# Patient Record
Sex: FEMALE | Race: WHITE
[De-identification: names, ages, dates, MRNs, and addresses within clinical notes are randomized per-mention and may not be internally consistent; named-entity substitution may affect disease eponyms.]

---

## 2017-05-14 ENCOUNTER — HOSPITAL ENCOUNTER (EMERGENCY)
Dept: HOSPITAL 35 - ER | Age: 42
Discharge: HOME | End: 2017-05-14
Payer: COMMERCIAL

## 2017-05-14 VITALS — BODY MASS INDEX: 51.91 KG/M2 | WEIGHT: 293 LBS | HEIGHT: 63 IN

## 2017-05-14 VITALS — DIASTOLIC BLOOD PRESSURE: 78 MMHG | SYSTOLIC BLOOD PRESSURE: 150 MMHG

## 2017-05-14 DIAGNOSIS — Z90.89: ICD-10-CM

## 2017-05-14 DIAGNOSIS — Z88.5: ICD-10-CM

## 2017-05-14 DIAGNOSIS — F32.9: ICD-10-CM

## 2017-05-14 DIAGNOSIS — Z88.1: ICD-10-CM

## 2017-05-14 DIAGNOSIS — J44.9: ICD-10-CM

## 2017-05-14 DIAGNOSIS — I10: ICD-10-CM

## 2017-05-14 DIAGNOSIS — L02.212: Primary | ICD-10-CM

## 2017-05-14 DIAGNOSIS — K21.9: ICD-10-CM

## 2017-05-14 DIAGNOSIS — F17.210: ICD-10-CM

## 2017-05-14 DIAGNOSIS — E11.9: ICD-10-CM

## 2017-05-14 DIAGNOSIS — Z90.49: ICD-10-CM

## 2017-05-14 DIAGNOSIS — Z88.0: ICD-10-CM

## 2017-05-14 DIAGNOSIS — F41.9: ICD-10-CM

## 2017-05-14 DIAGNOSIS — E66.9: ICD-10-CM

## 2018-06-09 ENCOUNTER — HOSPITAL ENCOUNTER (INPATIENT)
Dept: HOSPITAL 35 - ER | Age: 43
LOS: 3 days | Discharge: HOME | DRG: 189 | End: 2018-06-12
Attending: INTERNAL MEDICINE | Admitting: INTERNAL MEDICINE
Payer: COMMERCIAL

## 2018-06-09 VITALS — SYSTOLIC BLOOD PRESSURE: 140 MMHG | DIASTOLIC BLOOD PRESSURE: 63 MMHG

## 2018-06-09 VITALS — HEIGHT: 62.99 IN | BODY MASS INDEX: 51.91 KG/M2 | WEIGHT: 293 LBS

## 2018-06-09 DIAGNOSIS — J44.1: ICD-10-CM

## 2018-06-09 DIAGNOSIS — F32.9: ICD-10-CM

## 2018-06-09 DIAGNOSIS — J96.20: Primary | ICD-10-CM

## 2018-06-09 DIAGNOSIS — F41.9: ICD-10-CM

## 2018-06-09 DIAGNOSIS — F17.210: ICD-10-CM

## 2018-06-09 DIAGNOSIS — Z71.6: ICD-10-CM

## 2018-06-09 DIAGNOSIS — I10: ICD-10-CM

## 2018-06-09 DIAGNOSIS — Z88.0: ICD-10-CM

## 2018-06-09 DIAGNOSIS — Z79.4: ICD-10-CM

## 2018-06-09 DIAGNOSIS — Z88.5: ICD-10-CM

## 2018-06-09 DIAGNOSIS — E66.9: ICD-10-CM

## 2018-06-09 DIAGNOSIS — Z79.899: ICD-10-CM

## 2018-06-09 DIAGNOSIS — E11.65: ICD-10-CM

## 2018-06-09 DIAGNOSIS — Z88.1: ICD-10-CM

## 2018-06-09 DIAGNOSIS — K21.9: ICD-10-CM

## 2018-06-09 DIAGNOSIS — E78.5: ICD-10-CM

## 2018-06-09 LAB
ANION GAP SERPL CALC-SCNC: 12 MMOL/L (ref 7–16)
BUN SERPL-MCNC: 12 MG/DL (ref 7–18)
CALCIUM SERPL-MCNC: 8.8 MG/DL (ref 8.5–10.1)
CHLORIDE SERPL-SCNC: 97 MMOL/L (ref 98–107)
CO2 SERPL-SCNC: 23 MMOL/L (ref 21–32)
CREAT SERPL-MCNC: 1.2 MG/DL (ref 0.6–1)
ERYTHROCYTE [DISTWIDTH] IN BLOOD BY AUTOMATED COUNT: 14 % (ref 10.5–14.5)
GLUCOSE SERPL-MCNC: 611 MG/DL (ref 74–106)
HCT VFR BLD CALC: 43 % (ref 37–47)
HGB BLD-MCNC: 14.7 GM/DL (ref 12–15)
MCH RBC QN AUTO: 30.6 PG (ref 26–34)
MCHC RBC AUTO-ENTMCNC: 34.3 G/DL (ref 28–37)
MCV RBC: 89.3 FL (ref 80–100)
PLATELET # BLD: 248 THOU/UL (ref 150–400)
POTASSIUM SERPL-SCNC: 3.9 MMOL/L (ref 3.5–5.1)
RBC # BLD AUTO: 4.81 MIL/UL (ref 4.2–5)
SODIUM SERPL-SCNC: 132 MMOL/L (ref 136–145)
TROPONIN I SERPL-MCNC: < 0.04 NG/ML (ref ?–0.06)
WBC # BLD AUTO: 10.2 THOU/UL (ref 4–11)

## 2018-06-09 PROCEDURE — 10045: CPT

## 2018-06-10 VITALS — DIASTOLIC BLOOD PRESSURE: 73 MMHG | SYSTOLIC BLOOD PRESSURE: 90 MMHG

## 2018-06-10 VITALS — DIASTOLIC BLOOD PRESSURE: 52 MMHG | SYSTOLIC BLOOD PRESSURE: 108 MMHG

## 2018-06-10 VITALS — SYSTOLIC BLOOD PRESSURE: 125 MMHG | DIASTOLIC BLOOD PRESSURE: 78 MMHG

## 2018-06-10 VITALS — SYSTOLIC BLOOD PRESSURE: 140 MMHG | DIASTOLIC BLOOD PRESSURE: 63 MMHG

## 2018-06-10 VITALS — SYSTOLIC BLOOD PRESSURE: 137 MMHG | DIASTOLIC BLOOD PRESSURE: 76 MMHG

## 2018-06-10 VITALS — SYSTOLIC BLOOD PRESSURE: 147 MMHG | DIASTOLIC BLOOD PRESSURE: 70 MMHG

## 2018-06-11 VITALS — SYSTOLIC BLOOD PRESSURE: 125 MMHG | DIASTOLIC BLOOD PRESSURE: 57 MMHG

## 2018-06-11 VITALS — SYSTOLIC BLOOD PRESSURE: 118 MMHG | DIASTOLIC BLOOD PRESSURE: 53 MMHG

## 2018-06-11 VITALS — DIASTOLIC BLOOD PRESSURE: 74 MMHG | SYSTOLIC BLOOD PRESSURE: 117 MMHG

## 2018-06-11 VITALS — SYSTOLIC BLOOD PRESSURE: 146 MMHG | DIASTOLIC BLOOD PRESSURE: 74 MMHG

## 2018-06-11 LAB
ALBUMIN SERPL-MCNC: 2.4 G/DL (ref 3.4–5)
ALT SERPL-CCNC: 10 U/L (ref 30–65)
ANION GAP SERPL CALC-SCNC: 12 MMOL/L (ref 7–16)
AST SERPL-CCNC: 9 U/L (ref 15–37)
BILIRUB SERPL-MCNC: 0.2 MG/DL
BUN SERPL-MCNC: 17 MG/DL (ref 7–18)
CALCIUM SERPL-MCNC: 8.7 MG/DL (ref 8.5–10.1)
CHLORIDE SERPL-SCNC: 101 MMOL/L (ref 98–107)
CO2 SERPL-SCNC: 21 MMOL/L (ref 21–32)
CREAT SERPL-MCNC: 0.8 MG/DL (ref 0.6–1)
ERYTHROCYTE [DISTWIDTH] IN BLOOD BY AUTOMATED COUNT: 13.8 % (ref 10.5–14.5)
GLUCOSE SERPL-MCNC: 412 MG/DL (ref 74–106)
HCT VFR BLD CALC: 42.2 % (ref 37–47)
HGB BLD-MCNC: 14.3 GM/DL (ref 12–15)
MCH RBC QN AUTO: 30.3 PG (ref 26–34)
MCHC RBC AUTO-ENTMCNC: 34 G/DL (ref 28–37)
MCV RBC: 89.1 FL (ref 80–100)
PLATELET # BLD: 248 THOU/UL (ref 150–400)
POTASSIUM SERPL-SCNC: 4.4 MMOL/L (ref 3.5–5.1)
PROT SERPL-MCNC: 6.3 G/DL (ref 6.4–8.2)
RBC # BLD AUTO: 4.74 MIL/UL (ref 4.2–5)
SODIUM SERPL-SCNC: 134 MMOL/L (ref 136–145)
WBC # BLD AUTO: 11.4 THOU/UL (ref 4–11)

## 2018-06-12 VITALS — SYSTOLIC BLOOD PRESSURE: 117 MMHG | DIASTOLIC BLOOD PRESSURE: 69 MMHG

## 2018-06-12 VITALS — DIASTOLIC BLOOD PRESSURE: 68 MMHG | SYSTOLIC BLOOD PRESSURE: 129 MMHG

## 2018-06-12 VITALS — SYSTOLIC BLOOD PRESSURE: 129 MMHG | DIASTOLIC BLOOD PRESSURE: 68 MMHG

## 2018-06-12 LAB
ANION GAP SERPL CALC-SCNC: 10 MMOL/L (ref 7–16)
BASOPHILS NFR BLD AUTO: 0.1 % (ref 0–2)
BUN SERPL-MCNC: 25 MG/DL (ref 7–18)
CALCIUM SERPL-MCNC: 8.7 MG/DL (ref 8.5–10.1)
CHLORIDE SERPL-SCNC: 104 MMOL/L (ref 98–107)
CO2 SERPL-SCNC: 22 MMOL/L (ref 21–32)
CREAT SERPL-MCNC: 0.9 MG/DL (ref 0.6–1)
EOSINOPHIL NFR BLD: 0 % (ref 0–3)
ERYTHROCYTE [DISTWIDTH] IN BLOOD BY AUTOMATED COUNT: 14.3 % (ref 10.5–14.5)
EST. AVERAGE GLUCOSE BLD GHB EST-MCNC: 332 MG/DL
GLUCOSE SERPL-MCNC: 297 MG/DL (ref 74–106)
GLYCOHEMOGLOBIN (HGB A1C): 13.2 % (ref 4.8–5.6)
GRANULOCYTES NFR BLD MANUAL: 77.9 % (ref 36–66)
HCT VFR BLD CALC: 42.1 % (ref 37–47)
HGB BLD-MCNC: 14.1 GM/DL (ref 12–15)
LYMPHOCYTES NFR BLD AUTO: 17.4 % (ref 24–44)
MCH RBC QN AUTO: 30.1 PG (ref 26–34)
MCHC RBC AUTO-ENTMCNC: 33.4 G/DL (ref 28–37)
MCV RBC: 90.1 FL (ref 80–100)
MONOCYTES NFR BLD: 4.6 % (ref 1–8)
NEUTROPHILS # BLD: 9.2 THOU/UL (ref 1.4–8.2)
PLATELET # BLD: 262 THOU/UL (ref 150–400)
POTASSIUM SERPL-SCNC: 4 MMOL/L (ref 3.5–5.1)
RBC # BLD AUTO: 4.67 MIL/UL (ref 4.2–5)
SODIUM SERPL-SCNC: 136 MMOL/L (ref 136–145)
WBC # BLD AUTO: 11.8 THOU/UL (ref 4–11)

## 2018-07-11 ENCOUNTER — HOSPITAL ENCOUNTER (EMERGENCY)
Dept: HOSPITAL 35 - ER | Age: 43
Discharge: HOME | End: 2018-07-11
Payer: COMMERCIAL

## 2018-07-11 VITALS — HEIGHT: 63 IN | WEIGHT: 293 LBS | BODY MASS INDEX: 51.91 KG/M2

## 2018-07-11 VITALS — SYSTOLIC BLOOD PRESSURE: 132 MMHG | DIASTOLIC BLOOD PRESSURE: 75 MMHG

## 2018-07-11 DIAGNOSIS — K21.9: ICD-10-CM

## 2018-07-11 DIAGNOSIS — Z86.718: ICD-10-CM

## 2018-07-11 DIAGNOSIS — E66.01: ICD-10-CM

## 2018-07-11 DIAGNOSIS — Z90.49: ICD-10-CM

## 2018-07-11 DIAGNOSIS — I10: ICD-10-CM

## 2018-07-11 DIAGNOSIS — J18.9: Primary | ICD-10-CM

## 2018-07-11 DIAGNOSIS — F32.9: ICD-10-CM

## 2018-07-11 DIAGNOSIS — Z79.4: ICD-10-CM

## 2018-07-11 DIAGNOSIS — J44.9: ICD-10-CM

## 2018-07-11 DIAGNOSIS — E11.9: ICD-10-CM

## 2018-07-11 DIAGNOSIS — F41.9: ICD-10-CM

## 2018-07-11 DIAGNOSIS — Z88.1: ICD-10-CM

## 2018-07-11 DIAGNOSIS — F17.210: ICD-10-CM

## 2018-07-11 DIAGNOSIS — Z88.0: ICD-10-CM

## 2018-07-11 DIAGNOSIS — E78.5: ICD-10-CM

## 2018-07-11 DIAGNOSIS — Z88.8: ICD-10-CM

## 2018-07-11 LAB
ANION GAP SERPL CALC-SCNC: 3 MMOL/L (ref 7–16)
BASOPHILS NFR BLD AUTO: 1 % (ref 0–2)
BUN SERPL-MCNC: 18 MG/DL (ref 7–18)
CALCIUM SERPL-MCNC: 9.2 MG/DL (ref 8.5–10.1)
CHLORIDE SERPL-SCNC: 103 MMOL/L (ref 98–107)
CO2 SERPL-SCNC: 28 MMOL/L (ref 21–32)
CREAT SERPL-MCNC: 1.1 MG/DL (ref 0.6–1)
EOSINOPHIL NFR BLD: 2 % (ref 0–3)
ERYTHROCYTE [DISTWIDTH] IN BLOOD BY AUTOMATED COUNT: 14.2 % (ref 10.5–14.5)
GLUCOSE SERPL-MCNC: 180 MG/DL (ref 74–106)
GRANULOCYTES NFR BLD MANUAL: 72.8 % (ref 36–66)
HCT VFR BLD CALC: 40.7 % (ref 37–47)
HGB BLD-MCNC: 13.9 GM/DL (ref 12–15)
LYMPHOCYTES NFR BLD AUTO: 19.7 % (ref 24–44)
MCH RBC QN AUTO: 30.3 PG (ref 26–34)
MCHC RBC AUTO-ENTMCNC: 34.2 G/DL (ref 28–37)
MCV RBC: 88.7 FL (ref 80–100)
MONOCYTES NFR BLD: 4.5 % (ref 1–8)
NEUTROPHILS # BLD: 10.5 THOU/UL (ref 1.4–8.2)
PLATELET # BLD: 343 THOU/UL (ref 150–400)
POTASSIUM SERPL-SCNC: 4.2 MMOL/L (ref 3.5–5.1)
RBC # BLD AUTO: 4.58 MIL/UL (ref 4.2–5)
SODIUM SERPL-SCNC: 134 MMOL/L (ref 136–145)
TROPONIN I SERPL-MCNC: <0.06 NG/ML (ref ?–0.06)
WBC # BLD AUTO: 14.4 THOU/UL (ref 4–11)

## 2019-04-02 ENCOUNTER — HOSPITAL ENCOUNTER (EMERGENCY)
Dept: HOSPITAL 35 - ER | Age: 44
Discharge: HOME | End: 2019-04-02
Payer: COMMERCIAL

## 2019-04-02 VITALS — SYSTOLIC BLOOD PRESSURE: 139 MMHG | DIASTOLIC BLOOD PRESSURE: 86 MMHG

## 2019-04-02 VITALS — BODY MASS INDEX: 51.91 KG/M2 | HEIGHT: 63 IN | WEIGHT: 293 LBS

## 2019-04-02 DIAGNOSIS — F17.210: ICD-10-CM

## 2019-04-02 DIAGNOSIS — K21.9: ICD-10-CM

## 2019-04-02 DIAGNOSIS — Z88.1: ICD-10-CM

## 2019-04-02 DIAGNOSIS — R07.89: ICD-10-CM

## 2019-04-02 DIAGNOSIS — E78.5: ICD-10-CM

## 2019-04-02 DIAGNOSIS — E11.9: ICD-10-CM

## 2019-04-02 DIAGNOSIS — Z79.4: ICD-10-CM

## 2019-04-02 DIAGNOSIS — J18.9: Primary | ICD-10-CM

## 2019-04-02 DIAGNOSIS — Z88.0: ICD-10-CM

## 2019-04-02 DIAGNOSIS — I10: ICD-10-CM

## 2019-04-02 DIAGNOSIS — Z88.5: ICD-10-CM

## 2019-04-02 DIAGNOSIS — Z79.899: ICD-10-CM

## 2019-04-02 DIAGNOSIS — Z86.718: ICD-10-CM

## 2019-04-02 DIAGNOSIS — J44.9: ICD-10-CM

## 2019-04-02 LAB
ALBUMIN SERPL-MCNC: 4.3 G/DL (ref 3.4–5)
ALT SERPL-CCNC: 38 U/L (ref 30–65)
ANION GAP SERPL CALC-SCNC: 11 MMOL/L (ref 7–16)
AST SERPL-CCNC: 30 U/L (ref 15–37)
BASOPHILS NFR BLD AUTO: 0.4 % (ref 0–2)
BILIRUB SERPL-MCNC: 0.3 MG/DL
BUN SERPL-MCNC: 21 MG/DL (ref 7–18)
CALCIUM SERPL-MCNC: 11 MG/DL (ref 8.5–10.1)
CHLORIDE SERPL-SCNC: 102 MMOL/L (ref 98–107)
CO2 SERPL-SCNC: 27 MMOL/L (ref 21–32)
CREAT SERPL-MCNC: 1.1 MG/DL (ref 0.6–1)
EOSINOPHIL NFR BLD: 2.5 % (ref 0–3)
ERYTHROCYTE [DISTWIDTH] IN BLOOD BY AUTOMATED COUNT: 12.6 % (ref 10.5–14.5)
GLUCOSE SERPL-MCNC: 127 MG/DL (ref 74–106)
GRANULOCYTES NFR BLD MANUAL: 65.8 % (ref 36–66)
HCT VFR BLD CALC: 36.7 % (ref 37–47)
HGB BLD-MCNC: 12.7 GM/DL (ref 12–15)
LYMPHOCYTES NFR BLD AUTO: 24 % (ref 24–44)
MCH RBC QN AUTO: 33.2 PG (ref 26–34)
MCHC RBC AUTO-ENTMCNC: 34.5 G/DL (ref 28–37)
MCV RBC: 96.2 FL (ref 80–100)
MONOCYTES NFR BLD: 7.3 % (ref 1–8)
NEUTROPHILS # BLD: 4 THOU/UL (ref 1.4–8.2)
PLATELET # BLD: 192 THOU/UL (ref 150–400)
POTASSIUM SERPL-SCNC: 3.5 MMOL/L (ref 3.5–5.1)
PROT SERPL-MCNC: 7.6 G/DL (ref 6.4–8.2)
RBC # BLD AUTO: 3.82 MIL/UL (ref 4.2–5)
SODIUM SERPL-SCNC: 140 MMOL/L (ref 136–145)
TROPONIN I SERPL-MCNC: <0.06 NG/ML (ref ?–0.06)
WBC # BLD AUTO: 6 THOU/UL (ref 4–11)

## 2019-04-10 ENCOUNTER — HOSPITAL ENCOUNTER (EMERGENCY)
Dept: HOSPITAL 35 - ER | Age: 44
LOS: 1 days | Discharge: HOME | End: 2019-04-11
Payer: COMMERCIAL

## 2019-04-10 VITALS — HEIGHT: 63 IN | BODY MASS INDEX: 51.74 KG/M2 | WEIGHT: 292 LBS

## 2019-04-10 DIAGNOSIS — Z88.1: ICD-10-CM

## 2019-04-10 DIAGNOSIS — Z90.49: ICD-10-CM

## 2019-04-10 DIAGNOSIS — I10: ICD-10-CM

## 2019-04-10 DIAGNOSIS — Z79.4: ICD-10-CM

## 2019-04-10 DIAGNOSIS — E11.9: ICD-10-CM

## 2019-04-10 DIAGNOSIS — J44.9: ICD-10-CM
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
## 2019-04-10 DIAGNOSIS — Z88.0: ICD-10-CM

## 2019-04-10 DIAGNOSIS — K21.9: ICD-10-CM

## 2019-04-10 DIAGNOSIS — F17.210: ICD-10-CM

## 2019-04-10 DIAGNOSIS — Z86.718: ICD-10-CM

## 2019-04-10 DIAGNOSIS — E78.5: ICD-10-CM

## 2019-04-10 DIAGNOSIS — S09.8XXA: Primary | ICD-10-CM
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
## 2019-04-10 DIAGNOSIS — F32.9: ICD-10-CM

## 2019-04-10 DIAGNOSIS — F41.9: ICD-10-CM

## 2019-04-10 DIAGNOSIS — E66.9: ICD-10-CM

## 2019-04-10 DIAGNOSIS — Z88.5: ICD-10-CM
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
## 2019-04-11 VITALS — SYSTOLIC BLOOD PRESSURE: 126 MMHG | DIASTOLIC BLOOD PRESSURE: 72 MMHG

## 2019-04-22 ENCOUNTER — HOSPITAL ENCOUNTER (EMERGENCY)
Dept: HOSPITAL 35 - ER | Age: 44
Discharge: HOME | End: 2019-04-22
Payer: COMMERCIAL

## 2019-04-22 VITALS — DIASTOLIC BLOOD PRESSURE: 66 MMHG | SYSTOLIC BLOOD PRESSURE: 107 MMHG

## 2019-04-22 VITALS — BODY MASS INDEX: 51.91 KG/M2 | HEIGHT: 63 IN | WEIGHT: 293 LBS

## 2019-04-22 DIAGNOSIS — Z90.49: ICD-10-CM

## 2019-04-22 DIAGNOSIS — Z88.0: ICD-10-CM

## 2019-04-22 DIAGNOSIS — Z88.8: ICD-10-CM

## 2019-04-22 DIAGNOSIS — Z86.718: ICD-10-CM

## 2019-04-22 DIAGNOSIS — I10: ICD-10-CM

## 2019-04-22 DIAGNOSIS — F41.9: ICD-10-CM

## 2019-04-22 DIAGNOSIS — F17.210: ICD-10-CM

## 2019-04-22 DIAGNOSIS — Z79.4: ICD-10-CM

## 2019-04-22 DIAGNOSIS — J18.8: Primary | ICD-10-CM

## 2019-04-22 DIAGNOSIS — F32.9: ICD-10-CM

## 2019-04-22 DIAGNOSIS — K21.9: ICD-10-CM

## 2019-04-22 DIAGNOSIS — J44.9: ICD-10-CM

## 2019-04-22 DIAGNOSIS — E66.9: ICD-10-CM

## 2019-04-22 DIAGNOSIS — E11.9: ICD-10-CM

## 2019-04-22 DIAGNOSIS — Z88.1: ICD-10-CM

## 2019-04-22 DIAGNOSIS — E78.5: ICD-10-CM

## 2019-04-22 LAB
ANION GAP SERPL CALC-SCNC: 11 MMOL/L (ref 7–16)
BASOPHILS NFR BLD AUTO: 1.1 % (ref 0–2)
BUN SERPL-MCNC: 11 MG/DL (ref 7–18)
CALCIUM SERPL-MCNC: 8.8 MG/DL (ref 8.5–10.1)
CHLORIDE SERPL-SCNC: 101 MMOL/L (ref 98–107)
CO2 SERPL-SCNC: 23 MMOL/L (ref 21–32)
CREAT SERPL-MCNC: 1.5 MG/DL (ref 0.6–1)
EOSINOPHIL NFR BLD: 0.6 % (ref 0–3)
ERYTHROCYTE [DISTWIDTH] IN BLOOD BY AUTOMATED COUNT: 13.9 % (ref 10.5–14.5)
GLUCOSE SERPL-MCNC: 403 MG/DL (ref 74–106)
GRANULOCYTES NFR BLD MANUAL: 80.1 % (ref 36–66)
HCT VFR BLD CALC: 38.6 % (ref 37–47)
HGB BLD-MCNC: 13.1 GM/DL (ref 12–15)
LYMPHOCYTES NFR BLD AUTO: 14.3 % (ref 24–44)
MCH RBC QN AUTO: 30 PG (ref 26–34)
MCHC RBC AUTO-ENTMCNC: 33.8 G/DL (ref 28–37)
MCV RBC: 88.5 FL (ref 80–100)
MONOCYTES NFR BLD: 3.9 % (ref 1–8)
NEUTROPHILS # BLD: 12.5 THOU/UL (ref 1.4–8.2)
PLATELET # BLD: 326 THOU/UL (ref 150–400)
POTASSIUM SERPL-SCNC: 4.4 MMOL/L (ref 3.5–5.1)
RBC # BLD AUTO: 4.36 MIL/UL (ref 4.2–5)
SODIUM SERPL-SCNC: 135 MMOL/L (ref 136–145)
TROPONIN I SERPL-MCNC: <0.06 NG/ML (ref ?–0.06)
WBC # BLD AUTO: 15.6 THOU/UL (ref 4–11)

## 2019-08-31 ENCOUNTER — HOSPITAL ENCOUNTER (EMERGENCY)
Dept: HOSPITAL 35 - ER | Age: 44
LOS: 1 days | End: 2019-09-01
Payer: COMMERCIAL

## 2019-08-31 VITALS — HEIGHT: 63 IN | BODY MASS INDEX: 51.91 KG/M2 | WEIGHT: 293 LBS
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
## 2019-08-31 DIAGNOSIS — E11.9: ICD-10-CM

## 2019-08-31 DIAGNOSIS — S70.01XA: Primary | ICD-10-CM
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
## 2019-08-31 DIAGNOSIS — J44.9: ICD-10-CM

## 2019-08-31 DIAGNOSIS — Z86.718: ICD-10-CM

## 2019-08-31 DIAGNOSIS — S39.012A: ICD-10-CM

## 2019-08-31 DIAGNOSIS — I10: ICD-10-CM

## 2019-08-31 DIAGNOSIS — Z79.4: ICD-10-CM

## 2019-08-31 DIAGNOSIS — K21.9: ICD-10-CM

## 2019-08-31 DIAGNOSIS — E78.5: ICD-10-CM

## 2019-08-31 DIAGNOSIS — Z88.6: ICD-10-CM
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
## 2019-08-31 DIAGNOSIS — F32.9: ICD-10-CM

## 2019-08-31 DIAGNOSIS — Z88.1: ICD-10-CM

## 2019-08-31 DIAGNOSIS — F41.9: ICD-10-CM

## 2019-08-31 DIAGNOSIS — F17.210: ICD-10-CM
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
## 2019-08-31 DIAGNOSIS — Z90.49: ICD-10-CM

## 2019-08-31 DIAGNOSIS — Z88.0: ICD-10-CM

## 2019-09-01 VITALS — DIASTOLIC BLOOD PRESSURE: 60 MMHG | SYSTOLIC BLOOD PRESSURE: 123 MMHG

## 2020-02-10 ENCOUNTER — HOSPITAL ENCOUNTER (EMERGENCY)
Dept: HOSPITAL 35 - ER | Age: 45
Discharge: HOME | End: 2020-02-10
Payer: COMMERCIAL

## 2020-02-10 VITALS — HEIGHT: 63 IN | BODY MASS INDEX: 51.91 KG/M2 | WEIGHT: 293 LBS

## 2020-02-10 VITALS — DIASTOLIC BLOOD PRESSURE: 87 MMHG | SYSTOLIC BLOOD PRESSURE: 141 MMHG

## 2020-02-10 DIAGNOSIS — K21.9: ICD-10-CM

## 2020-02-10 DIAGNOSIS — R07.89: Primary | ICD-10-CM

## 2020-02-10 DIAGNOSIS — Z86.711: ICD-10-CM

## 2020-02-10 DIAGNOSIS — Z90.89: ICD-10-CM

## 2020-02-10 DIAGNOSIS — F41.9: ICD-10-CM

## 2020-02-10 DIAGNOSIS — Z88.5: ICD-10-CM

## 2020-02-10 DIAGNOSIS — Z88.1: ICD-10-CM

## 2020-02-10 DIAGNOSIS — Z90.49: ICD-10-CM

## 2020-02-10 DIAGNOSIS — E78.5: ICD-10-CM

## 2020-02-10 DIAGNOSIS — F32.9: ICD-10-CM

## 2020-02-10 DIAGNOSIS — F17.210: ICD-10-CM

## 2020-02-10 DIAGNOSIS — Z79.4: ICD-10-CM

## 2020-02-10 DIAGNOSIS — I10: ICD-10-CM

## 2020-02-10 DIAGNOSIS — J44.9: ICD-10-CM

## 2020-02-10 DIAGNOSIS — Z88.0: ICD-10-CM

## 2020-02-10 LAB
ANION GAP SERPL CALC-SCNC: 9 MMOL/L (ref 7–16)
BASOPHILS NFR BLD AUTO: 0.7 % (ref 0–2)
BUN SERPL-MCNC: 18 MG/DL (ref 7–18)
CALCIUM SERPL-MCNC: 9 MG/DL (ref 8.5–10.1)
CHLORIDE SERPL-SCNC: 98 MMOL/L (ref 98–107)
CO2 SERPL-SCNC: 26 MMOL/L (ref 21–32)
CREAT SERPL-MCNC: 1.2 MG/DL (ref 0.6–1)
EOSINOPHIL NFR BLD: 1.4 % (ref 0–3)
ERYTHROCYTE [DISTWIDTH] IN BLOOD BY AUTOMATED COUNT: 14.5 % (ref 10.5–14.5)
GLUCOSE SERPL-MCNC: 501 MG/DL (ref 74–106)
GRANULOCYTES NFR BLD MANUAL: 76.7 % (ref 36–66)
HCT VFR BLD CALC: 39.2 % (ref 37–47)
HGB BLD-MCNC: 13.1 GM/DL (ref 12–15)
LYMPHOCYTES NFR BLD AUTO: 17 % (ref 24–44)
MCH RBC QN AUTO: 28.4 PG (ref 26–34)
MCHC RBC AUTO-ENTMCNC: 33.4 G/DL (ref 28–37)
MCV RBC: 84.9 FL (ref 80–100)
MONOCYTES NFR BLD: 4.2 % (ref 1–8)
NEUTROPHILS # BLD: 7.1 THOU/UL (ref 1.4–8.2)
PLATELET # BLD: 282 THOU/UL (ref 150–400)
POTASSIUM SERPL-SCNC: 4.4 MMOL/L (ref 3.5–5.1)
RBC # BLD AUTO: 4.62 MIL/UL (ref 4.2–5)
SODIUM SERPL-SCNC: 133 MMOL/L (ref 136–145)
TROPONIN I SERPL-MCNC: <0.06 NG/ML (ref ?–0.06)
WBC # BLD AUTO: 9.3 THOU/UL (ref 4–11)

## 2020-02-12 NOTE — EKG
[facility]
[place]   [address]                     ELECTROCARDIOGRAM REPORT      
_______________________________________________________________________________
 
Name:       EDSON BELL                Room #:                     [facility]#:      [id number]                       Account #:      [id number]  
Admission:  02/10/20    Attend Phys:                          
Discharge:  02/10/20    Date of Birth:  [birth year]  
                                                          Report #: [id number]
                                                                    [id number]
_______________________________________________________________________________
THIS REPORT FOR:  
 
cc:  SAVANNA MEDINA DO           
     Physician not on staff        
     Néstor Pryor MD [facility]                                        ~
THIS REPORT FOR:   //name//                          
 
                         [facility] ED
                                       
Test Date:    2020-02-10               Test Time:    17:35:30
Pat Name:     EDSON BELL             Department:   
Patient ID:   [id number]            Room:          
Gender:       F                        Technician:   
:          1975               Requested By: Shantel Mcclellan
Order Number: [id number] MD:   Néstor Pryor
                                 Measurements
Intervals                              Axis          
Rate:         106                      P:            45
CA:           117                      QRS:          16
QRSD:         129                      T:            0
QT:           359                                    
QTc:          477                                    
                           Interpretive Statements
Sinus tachycardia
Inferior infarct, old
Compared to ECG 2019 20:38:57
No significant change was found
Electronically Signed On 2020 9:10:00 CST by Néstor Pryor
[email or web address]
 
 
 
 
 
 
 
 
 
 
 
 
 
 
 
  <ELECTRONICALLY SIGNED>
   By: Néstor Pryor MD, [facility]   
  20     0910
D: 02/10/20 1735                           _____________________________________
T: 02/10/20 173                           Néstor Pryor MD, [facility]     /EPI

## 2020-04-21 ENCOUNTER — HOSPITAL ENCOUNTER (INPATIENT)
Dept: HOSPITAL 35 - ER | Age: 45
LOS: 5 days | Discharge: HOME | DRG: 603 | End: 2020-04-26
Attending: HOSPITALIST | Admitting: HOSPITALIST
Payer: COMMERCIAL

## 2020-04-21 VITALS — SYSTOLIC BLOOD PRESSURE: 166 MMHG | DIASTOLIC BLOOD PRESSURE: 74 MMHG

## 2020-04-21 VITALS — SYSTOLIC BLOOD PRESSURE: 146 MMHG | DIASTOLIC BLOOD PRESSURE: 84 MMHG

## 2020-04-21 VITALS — BODY MASS INDEX: 51.91 KG/M2 | WEIGHT: 293 LBS | HEIGHT: 62.99 IN

## 2020-04-21 VITALS — DIASTOLIC BLOOD PRESSURE: 87 MMHG | SYSTOLIC BLOOD PRESSURE: 141 MMHG

## 2020-04-21 VITALS — SYSTOLIC BLOOD PRESSURE: 128 MMHG | DIASTOLIC BLOOD PRESSURE: 68 MMHG

## 2020-04-21 DIAGNOSIS — E78.5: ICD-10-CM

## 2020-04-21 DIAGNOSIS — Z86.718: ICD-10-CM

## 2020-04-21 DIAGNOSIS — L03.311: Primary | ICD-10-CM

## 2020-04-21 DIAGNOSIS — I12.9: ICD-10-CM

## 2020-04-21 DIAGNOSIS — F41.9: ICD-10-CM

## 2020-04-21 DIAGNOSIS — N18.3: ICD-10-CM

## 2020-04-21 DIAGNOSIS — E11.42: ICD-10-CM

## 2020-04-21 DIAGNOSIS — Z90.49: ICD-10-CM

## 2020-04-21 DIAGNOSIS — Z88.5: ICD-10-CM

## 2020-04-21 DIAGNOSIS — E66.01: ICD-10-CM

## 2020-04-21 DIAGNOSIS — Z98.890: ICD-10-CM

## 2020-04-21 DIAGNOSIS — M19.90: ICD-10-CM

## 2020-04-21 DIAGNOSIS — J44.9: ICD-10-CM

## 2020-04-21 DIAGNOSIS — Z87.891: ICD-10-CM

## 2020-04-21 DIAGNOSIS — E11.22: ICD-10-CM

## 2020-04-21 DIAGNOSIS — Z79.01: ICD-10-CM

## 2020-04-21 DIAGNOSIS — Z79.899: ICD-10-CM

## 2020-04-21 DIAGNOSIS — K21.9: ICD-10-CM

## 2020-04-21 DIAGNOSIS — Z88.8: ICD-10-CM

## 2020-04-21 DIAGNOSIS — Z88.6: ICD-10-CM

## 2020-04-21 DIAGNOSIS — Z88.0: ICD-10-CM

## 2020-04-21 DIAGNOSIS — Z79.4: ICD-10-CM

## 2020-04-21 DIAGNOSIS — F32.9: ICD-10-CM

## 2020-04-21 LAB
ALBUMIN SERPL-MCNC: 2.7 G/DL (ref 3.4–5)
ALT SERPL-CCNC: 20 U/L (ref 30–65)
ANION GAP SERPL CALC-SCNC: 13 MMOL/L (ref 7–16)
AST SERPL-CCNC: 14 U/L (ref 15–37)
BASOPHILS NFR BLD AUTO: 0.9 % (ref 0–2)
BILIRUB SERPL-MCNC: 0.3 MG/DL
BUN SERPL-MCNC: 17 MG/DL (ref 7–18)
CALCIUM SERPL-MCNC: 8.4 MG/DL (ref 8.5–10.1)
CHLORIDE SERPL-SCNC: 103 MMOL/L (ref 98–107)
CO2 SERPL-SCNC: 21 MMOL/L (ref 21–32)
CREAT SERPL-MCNC: 1.3 MG/DL (ref 0.6–1)
EOSINOPHIL NFR BLD: 1 % (ref 0–3)
ERYTHROCYTE [DISTWIDTH] IN BLOOD BY AUTOMATED COUNT: 16.2 % (ref 10.5–14.5)
GLUCOSE SERPL-MCNC: 434 MG/DL (ref 74–106)
GRANULOCYTES NFR BLD MANUAL: 76.7 % (ref 36–66)
HCT VFR BLD CALC: 40 % (ref 37–47)
HGB BLD-MCNC: 13.3 GM/DL (ref 12–15)
LYMPHOCYTES NFR BLD AUTO: 17.1 % (ref 24–44)
MCH RBC QN AUTO: 28.5 PG (ref 26–34)
MCHC RBC AUTO-ENTMCNC: 33.2 G/DL (ref 28–37)
MCV RBC: 86.1 FL (ref 80–100)
MONOCYTES NFR BLD: 4.3 % (ref 1–8)
NEUTROPHILS # BLD: 7.1 THOU/UL (ref 1.4–8.2)
PLATELET # BLD: 321 THOU/UL (ref 150–400)
POTASSIUM SERPL-SCNC: 5 MMOL/L (ref 3.5–5.1)
PROT SERPL-MCNC: 6 G/DL (ref 6.4–8.2)
RBC # BLD AUTO: 4.65 MIL/UL (ref 4.2–5)
SODIUM SERPL-SCNC: 137 MMOL/L (ref 136–145)
WBC # BLD AUTO: 9.3 THOU/UL (ref 4–11)

## 2020-04-21 PROCEDURE — 10195: CPT

## 2020-04-22 VITALS — SYSTOLIC BLOOD PRESSURE: 137 MMHG | DIASTOLIC BLOOD PRESSURE: 73 MMHG

## 2020-04-22 VITALS — SYSTOLIC BLOOD PRESSURE: 153 MMHG | DIASTOLIC BLOOD PRESSURE: 91 MMHG

## 2020-04-22 VITALS — SYSTOLIC BLOOD PRESSURE: 155 MMHG | DIASTOLIC BLOOD PRESSURE: 77 MMHG

## 2020-04-22 VITALS — SYSTOLIC BLOOD PRESSURE: 149 MMHG | DIASTOLIC BLOOD PRESSURE: 61 MMHG

## 2020-04-22 VITALS — SYSTOLIC BLOOD PRESSURE: 156 MMHG | DIASTOLIC BLOOD PRESSURE: 93 MMHG

## 2020-04-22 LAB
ANION GAP SERPL CALC-SCNC: 9 MMOL/L (ref 7–16)
BILIRUB UR-MCNC: NEGATIVE MG/DL
BUN SERPL-MCNC: 21 MG/DL (ref 7–18)
CALCIUM SERPL-MCNC: 7.9 MG/DL (ref 8.5–10.1)
CHLORIDE SERPL-SCNC: 101 MMOL/L (ref 98–107)
CO2 SERPL-SCNC: 25 MMOL/L (ref 21–32)
COLOR UR: YELLOW
CREAT SERPL-MCNC: 1.3 MG/DL (ref 0.6–1)
ERYTHROCYTE [DISTWIDTH] IN BLOOD BY AUTOMATED COUNT: 16.4 % (ref 10.5–14.5)
GLUCOSE SERPL-MCNC: 508 MG/DL (ref 74–106)
HCT VFR BLD CALC: 38.8 % (ref 37–47)
HGB BLD-MCNC: 12.6 GM/DL (ref 12–15)
HYALINE CASTS #/AREA URNS LPF: (no result) /LPF
INR PPP: 1.5
KETONES UR STRIP-MCNC: NEGATIVE MG/DL
MCH RBC QN AUTO: 28.5 PG (ref 26–34)
MCHC RBC AUTO-ENTMCNC: 32.6 G/DL (ref 28–37)
MCV RBC: 87.4 FL (ref 80–100)
PLATELET # BLD: 311 THOU/UL (ref 150–400)
POTASSIUM SERPL-SCNC: 4.7 MMOL/L (ref 3.5–5.1)
PROTHROMBIN TIME: 14.7 SECONDS (ref 9.3–11.4)
RBC # BLD AUTO: 4.44 MIL/UL (ref 4.2–5)
RBC # UR STRIP: (no result) /UL
RBC #/AREA URNS HPF: (no result) /HPF (ref 0–2)
SODIUM SERPL-SCNC: 135 MMOL/L (ref 136–145)
SP GR UR STRIP: 1.02 (ref 1–1.03)
SQUAMOUS: (no result) /LPF (ref 0–3)
URINE CLARITY: CLEAR
URINE GLUCOSE-RANDOM*: (no result)
URINE LEUKOCYTES-REFLEX: NEGATIVE
URINE NITRITE-REFLEX: NEGATIVE
URINE PROTEIN (DIPSTICK): (no result)
URINE WBC-REFLEX: (no result) /HPF (ref 0–5)
UROBILINOGEN UR STRIP-ACNC: 0.2 E.U./DL (ref 0.2–1)
WBC # BLD AUTO: 8.4 THOU/UL (ref 4–11)

## 2020-04-22 NOTE — HC
[facility]
[address]
[place]   [address]                     CONSULTATION                  
_______________________________________________________________________________
 
Name:       EDSON BELL                Room #:         [facility] [place]  
M.R.#:      [id number]                       Account #:      [id number]  
Admission:  04/21/20    Attend Phys:    Ishan Payne MD     
Discharge:              Date of Birth:  [birth year]  
                                                          Report #: [id number]
                                                                    [id number]   
_______________________________________________________________________________
THIS REPORT FOR:  
 
cc:  SAVANNA MEDINA DO           
     Physician not on staff                                               
     Verito Peña MD                                         ~
CC: SAVANNA Payne
    Physician staff
 
DATE OF SERVICE:  04/22/2020
 
 
ENDOCRINE CONSULTATION NOTE
 
CONSULTING PHYSICIAN:  Dr. Rodarte.
 
REASON FOR CONSULTATION:  Uncontrolled type 2 diabetes mellitus.
 
HISTORY OF PRESENT ILLNESS:  This is a 45-year-old female patient whose medical
background is significant for multiple significant medical issues including type
2 diabetes mellitus, diabetic neuropathy, COPD, GERD, hypertension and
hyperlipidemia in the setting of morbid obesity who presented yesterday with
complaints of progressive lower extremity edema, fluid retention and dyspnea on
exertion.  This seems to have progressed slowly over the past few weeks.
 
The patient notes that her history of type 2 diabetes mellitus dates back to
over 10 years ago and that she is supposed to be on a regimen of Levemir insulin
50 units twice a day and Humalog insulin 35 units t.i.d. a.c.  She does,
however, indicate that she has been unable to access her medications due to loss
of insurance and that there have been significant gaps in therapy, although she
did not specify and what matter and for how long.  She did, however, indicate
that her blood glucose values have predominantly been over 400 mg/dL for the
past few weeks without issues of hypoglycemia.
 
The patient's background is negative for diabetic retinopathy, but is noted for
peripheral diabetic neuropathy and although she takes gabapentin 300 mg b.i.d.,
she has not been able to control this aspect well.  Also, she notes a history of
stage 3 chronic kidney disease and that she was for some time under the care of
a nephrologist, but that she has not done so in quite a while due to loss of
health coverage.  The patient is not known to have CAD, CHF or strokes.
 
The patient is known to have hyperlipidemia and is maintained on simvastatin
therapy and she is also on antihypertensive therapy.
 
REVIEW OF SYSTEMS:
CONSTITUTIONAL:  Fatigue, tiredness, but no fever, chills or changes in body
 
 
 
[address]   [address]                     CONSULTATION                  
_______________________________________________________________________________
 
Name:       EDSON BELL                Room #:         [address] [place] [address] [place]  
..#:      [id number]                       Account #:      [id number]  
Admission:  04/21/20    Attend Phys:    Ishan Payne MD     
Discharge:              Date of Birth:  [birth year]  
                                                          Report #: [id number]
                                                                    [id number]   
_______________________________________________________________________________
weight.
HEENT:  Negative for sore throat, sinus pain, ear drainage.
PULMONARY:  Noted for dyspnea on exertion, intermittent cough, but no
hemoptysis.
CARDIAC:  Lower extremity edema, dyspnea on exertion or orthopnea.  No chest
pain or palpitations.
GASTROINTESTINAL:  Abdominal discomfort, abdominal distention and occasional
nausea, but not vomiting.
NEUROLOGY:  Baseline is noted for diabetic neuropathy that is painful, not well
controlled by the current gabapentin therapy, no seizure activities, frequent
severe headaches or loss of consciousness.
PSYCHIATRIC:  The patient has baseline anxiety and depression issues.  Seemingly
stable.  No active delusions or hallucinations.  Otherwise, review of systems is
noncontributory other than those mentioned in HPI.
 
PAST MEDICAL HISTORY:
1.  Type 2 diabetes mellitus.
2.  Peripheral diabetic neuropathy.
3.  Chronic kidney disease stage 3.
4.  Hypertension.
5.  Hyperlipidemia.
6.  Morbid obesity.
7.  Depression.
8.  Anxiety.
9.  Chronic obstructive pulmonary disease.
10.  Gastroesophageal reflux disease.
11.  History of deep venous thrombosis of the lower extremity.
12.  Osteoarthritis.
 
OUTPATIENT MEDICATIONS:  Include albuterol p.r.n., Coumadin 5 mg daily, vitamin
D3 25 mcg daily, loratadine 10 mg daily, diltiazem 120 mg daily, Cymbalta 30 mg
t.i.d., Neurontin 300 mg b.i.d., Zantac 150 mg b.i.d., Requip 1 mg at bedtime,
Desyrel 50 mg at bedtime, Elavil 100 mg at bedtime, Singulair 10 mg daily, Zocor
20 mg at bedtime, multivitamins, Aldactone 25 mg b.i.d., Xanax 0.25 mg t.i.d.,
Levemir insulin 50 units b.i.d., NovoLog 35 units t.i.d. a.c., omeprazole
b.i.d., bupropion  mg daily.
 
ALLERGIES:  SHE IS ALLERGIC TO DILAUDID, LEVOFLOXACIN, PENICILLIN.
 
FAMILY HISTORY:  Noncontributory.
 
SOCIAL HISTORY:  She is , has no children.  Denies active use of alcohol
or tobacco.
 
PHYSICAL EXAMINATION:
GENERAL:  Pleasant  female patient who is not in apparent pain or
 
 
 
[facility]
[address] [facility] [place]   [address]                     CONSULTATION                  
_______________________________________________________________________________
 
Name:       EDSON BELL                Room #:         438-P       [facility] [place]  
MARCOS#:      [id number]                       Account #:      [id number]  
Admission:  04/21/20    Attend Phys:    Ishan Payne MD     
Discharge:              Date of Birth:  [birth year]  
                                                          Report #: [id number]
                                                                    [id number]   
_______________________________________________________________________________
distress.
VITAL SIGNS:  Blood pressure is 137/73 mmHg, heart rate is 97 beats per minute,
respirations 18 per minute, temperature 36.6 degrees Celsius.
CONSTITUTIONAL:  The patient is sitting upright in bed, appears relatively
comfortable, not in apparent distress.
HEENT:  Anicteric sclerae.  Intact extraocular motions.
NECK:  Supple, without JVD.  No thyromegaly.
CHEST:  Noted for distant breath sounds, scattered rales, rhonchi.
HEART:  Regular rate and rhythm without murmurs or gallops.
ABDOMEN:  Obese, tense, but without guarding or significant tenderness.  Active
bowel sounds.
EXTREMITIES:  Lower extremity exam is noted for stasis dermatitis bilaterally,
+1 ankle edema bilaterally.  No skin breaks or ulcerations.  Sensation to light
touch is diminished.  Pedal pulses are diminished.
NEUROLOGICALLY:  Awake, alert and oriented to time, place and person.  The
remainder of her examination is nonfocal, other than for sensory deficits.
PSYCHIATRIC:  Pleasant, interactive.  Normal mood and affect.
 
LABORATORY DATA:  Blood glucose values have consistently been over 300 mg/dL and
ranged from 330 to 371 mg/dL.  Otherwise, sodium 135, potassium 4.7, chloride
101, CO2 of 25, anion gap 9, BUN 21, creatinine 1.3, AST 14, total bilirubin
0.3, calcium 7.9, magnesium 1.9, alkaline phosphatase 167, ALT 20, total protein
6.0, albumin 2.7, EGFR 44.  Lactic acid 1.6.  Troponin negative.  . 
White blood count 8.4, hemoglobin 12.6, hematocrit 38.8, platelets 311. 
Hemoglobin A1c in 06/2018 was 13.2%.
 
ASSESSMENT AND PLAN:
1.  Type 2 diabetes mellitus.
 
Uncontrolled as per her reported blood glucose values and as per the outlook of
historic hyperglycemia and multiple end-organ complications.  It is in question
as to whether or not the patient has actually been getting the reported insulin
regimen as she indicated the inability to access her insulin consistently due to
insurance barriers.  She is currently maintained on Lantus insulin 50 units
b.i.d. and Humalog insulin 35 units with meals.  Given the patient's persistent
hyperglycemia, I will raise her Lantus insulin to 60 units twice a day and
Humalog insulin to 44 units t.i.d. a.c. and maintain support with Humalog
supplemental scale.  Blood glucose monitoring will commence a.c. and at bedtime
and further therapeutic adjustments will be made accordingly.
 
I will obtain a hemoglobin A1c to better assess the patient's most recent
glycemic standing.  Towards home discharge, the patient might be more suited for
human insulin, potentially mixed insulin due to the cost advantage and to ensure
insulin intake consistency.
 
2.  Hyperlipidemia.
 
 
 
[place] [id number]                     CONSULTATION                  
_______________________________________________________________________________
 
Name:       EDSON BELL                Room #:         438-P       [facility] [place]  
[facility]#:      [id number]                       Account #:      [id number]  
Admission:  04/21/20    Attend Phys:    Ishan Payne MD     
Discharge:              Date of Birth:  [birth year]  
                                                          Report #: [id number]
                                                                    [id number]   
_______________________________________________________________________________
 
The patient is maintained on lipid-lowering therapy and is currently placed on
atorvastatin 10 mg daily, which she tolerates well, she is to continue with the
same.
 
3.  Peripheral diabetic neuropathy.
 
This is a baseline issue that is only partially controlled.  She is to continue
with the current Cymbalta and gabapentin therapy for the time being.
 
4.  Hypertension.
 
The patient's level of blood pressure control is adequate, she is to continue
with the current regimen.
 
5.  Renal insufficiency.
 
The patient has baseline stage 3 chronic kidney disease and her current renal
function indices are consistent with that.  I stressed the importance of
sustained blood sugar and blood pressure control in order to benefit this aspect
of her care, which she understood well.
 
I appreciate this consultation by Dr. Rodarte.
 
 
 
 
 
 
 
 
 
 
 
 
 
 
 
 
 
 
 
 
 
  <ELECTRONICALLY SIGNED>
   By: Verito Peña MD         
  04/22/20     1533
D: 04/22/20 1254                           _____________________________________
T: 04/22/20 1357                           Verito Peña MD           /nt

## 2020-04-22 NOTE — NUR
PT A&OX4. IV INTACT IN L FA. AMBULATES TO BSC WITH ASSIST X1. PI IS NON
COMPLIANT WITH HER DIABETIC DIET. FOUND GRAM CRACKER WRAPPERS ON THE FLOOR
THIS AM AT SHIFT CHANGE, PT IS ASKING FOR FOOD THRUGHOUT THE DAY, THIS NURSE
DID NOT GIVE HER ANY EXTRA FOOD OR SNACKS.

## 2020-04-22 NOTE — NUR
PT ADMITTED RELATED TO CELLULITIS. CM REVIEWED CHART AND SPOKE WITH CARE TEAM.
CM CALLED AND SPOKE WITH PT THIS DAY. SHE APPEARED TO BE A&O X4. CM ROLE
INTRODUCED. PT INDICATED SHE LIVES IN A HOUSE WITH HER SPOUSE, BROTHER, AND
SISTER IN [place]. PT INDICATED SHE HAD USED A FWW TO ASSIST WITH MOBILITY PTA AND
THAT SHE HAD BEEN INDEPENDENT WITH ADLS. PT INDICATED SHE HAD HOME 02 AT 2L
PTA THROUGH [employer] [facility]. PT HAS A NEBULIZER AS WELL. PT INDICATED
THAT SHE PLANS TO RETURN HOME ONCE MEDICALLY STABLE. CM TO FOLLOW AS INDICATED
WITH DC PLANNING.

## 2020-04-22 NOTE — NUR
PATIENT ARRIVED ON UNIT AT 2000 VIA CART FROM ED ACCOMPANIED BY ED ALESSANDRO.
PATIENT ALERT AND ORIENTED X4.  ABD OBESE WITH AN INFECTION UNDER PANIS.
ORDER RECEIVED TO PLACE INFRADRY UNDER PANIS TO KEEP IT DRY.  UNDER PANIS IS
RED WITH A SPLIT OF ABOUT AN INCH AND A HALF.  IT IS VERY TENDER TO PATIENT.
ACCUCHECK  WITH LAB.  NP WAS CALLED AND ORDERS TAKEN, AND FOLLOWED.
PUT AN SS LOW DOSE INSULIN AND GIVEN 12 UNITS.  IN ABOUT 2 HOURS IT WAS
337.PATIENT C/O PAIN.  PO MED GIVEN X2 WITH LITTLE RELIEF.  IV MED GIVEN X1
WITH EDUCATION THAT IT WAS TO GET HER OVER THE HUMP AND TO NOT DEPEND ON IT IF
SHE DID NOT NEED IT.  PATIENT SLEP[T LITTLE THIS NIGHT SHIFT.

## 2020-04-23 VITALS — DIASTOLIC BLOOD PRESSURE: 83 MMHG | SYSTOLIC BLOOD PRESSURE: 150 MMHG

## 2020-04-23 VITALS — DIASTOLIC BLOOD PRESSURE: 78 MMHG | SYSTOLIC BLOOD PRESSURE: 144 MMHG

## 2020-04-23 VITALS — SYSTOLIC BLOOD PRESSURE: 156 MMHG | DIASTOLIC BLOOD PRESSURE: 76 MMHG

## 2020-04-23 LAB
ANION GAP SERPL CALC-SCNC: 10 MMOL/L (ref 7–16)
BUN SERPL-MCNC: 22 MG/DL (ref 7–18)
CALCIUM SERPL-MCNC: 8 MG/DL (ref 8.5–10.1)
CHLORIDE SERPL-SCNC: 102 MMOL/L (ref 98–107)
CO2 SERPL-SCNC: 23 MMOL/L (ref 21–32)
CREAT SERPL-MCNC: 1.1 MG/DL (ref 0.6–1)
ERYTHROCYTE [DISTWIDTH] IN BLOOD BY AUTOMATED COUNT: 16.9 % (ref 10.5–14.5)
EST. AVERAGE GLUCOSE BLD GHB EST-MCNC: 258 MG/DL
GLUCOSE SERPL-MCNC: 235 MG/DL (ref 74–106)
GLYCOHEMOGLOBIN (HGB A1C): 10.6 % (ref 4.8–5.6)
HCT VFR BLD CALC: 38.6 % (ref 37–47)
HGB BLD-MCNC: 12.8 GM/DL (ref 12–15)
MCH RBC QN AUTO: 28.8 PG (ref 26–34)
MCHC RBC AUTO-ENTMCNC: 33.1 G/DL (ref 28–37)
MCV RBC: 86.9 FL (ref 80–100)
PLATELET # BLD: 327 THOU/UL (ref 150–400)
POTASSIUM SERPL-SCNC: 4.2 MMOL/L (ref 3.5–5.1)
RBC # BLD AUTO: 4.44 MIL/UL (ref 4.2–5)
SODIUM SERPL-SCNC: 135 MMOL/L (ref 136–145)
WBC # BLD AUTO: 9.5 THOU/UL (ref 4–11)

## 2020-04-23 NOTE — NUR
Assumed pt care at 1900, A/OX4, VSS. Up with min assist to BSC,RW/GB w/o
problems. C/o pain to BLE/under pannus, medicated per EMAR with relief
reported. Interdry fabric applied under pannus and in place,edema noted on BLE
encouraged to elevate extremities. Resting quietly at this time w/o distress
noted, call light/personal items within reach. Will continue to monitor pt.

## 2020-04-23 NOTE — NUR
PT A&OX4. AMBULATES WITH STANDBY ASSIST. IV FLUIDS INFUSING W/O COMPS IN L AC.
PT IS MORE COMPLIANT OF DIET TODAY ASKING LESS FOR FOOD TO EAT. I SPENT A
LENTHGY TIME YESTERDAY EDUCATING PT ON THE DANGERS OF NON COMPLIANCE WITH DIET
AND HIGH BLOOD SUGARS. PT VERBALIZES UNDERSTANDING. CALL LIGHT W/I REACH. WILL
CONT POC.

## 2020-04-23 NOTE — NUR
ON-GOING ASSESSMENT: PT REMAINS ON IV FLUCONAZOLE AND IS SLOWLY PROGRESSING
TOWARDS GOALS. CM WILL CONTINUE TO FOLLOW TO ASSIST AS NEEDED.

## 2020-04-24 VITALS — DIASTOLIC BLOOD PRESSURE: 89 MMHG | SYSTOLIC BLOOD PRESSURE: 166 MMHG

## 2020-04-24 VITALS — DIASTOLIC BLOOD PRESSURE: 64 MMHG | SYSTOLIC BLOOD PRESSURE: 139 MMHG

## 2020-04-24 VITALS — SYSTOLIC BLOOD PRESSURE: 158 MMHG | DIASTOLIC BLOOD PRESSURE: 65 MMHG

## 2020-04-24 LAB
ANION GAP SERPL CALC-SCNC: 9 MMOL/L (ref 7–16)
BUN SERPL-MCNC: 20 MG/DL (ref 7–18)
CALCIUM SERPL-MCNC: 8.2 MG/DL (ref 8.5–10.1)
CHLORIDE SERPL-SCNC: 106 MMOL/L (ref 98–107)
CO2 SERPL-SCNC: 24 MMOL/L (ref 21–32)
CREAT SERPL-MCNC: 1.1 MG/DL (ref 0.6–1)
ERYTHROCYTE [DISTWIDTH] IN BLOOD BY AUTOMATED COUNT: 16.3 % (ref 10.5–14.5)
GLUCOSE SERPL-MCNC: 138 MG/DL (ref 74–106)
HCT VFR BLD CALC: 36.6 % (ref 37–47)
HGB BLD-MCNC: 12.1 GM/DL (ref 12–15)
INR PPP: 2.7
MCH RBC QN AUTO: 28.6 PG (ref 26–34)
MCHC RBC AUTO-ENTMCNC: 33 G/DL (ref 28–37)
MCV RBC: 86.8 FL (ref 80–100)
PLATELET # BLD: 307 THOU/UL (ref 150–400)
POTASSIUM SERPL-SCNC: 4.4 MMOL/L (ref 3.5–5.1)
PROTHROMBIN TIME: 27.9 SECONDS (ref 9.3–11.4)
RBC # BLD AUTO: 4.21 MIL/UL (ref 4.2–5)
SODIUM SERPL-SCNC: 139 MMOL/L (ref 136–145)
WBC # BLD AUTO: 7.9 THOU/UL (ref 4–11)

## 2020-04-24 NOTE — NUR
PT IS SLOWLY PROGRESSING. IT IS ANTIPATED THAT PT WILL BE MEDICALLY STABLE TO
DC HOME OVER THE WEEKEND. PT HAD HOME O2 AND A FWW. IT IS ANTICIPATED THAT PT
WILL BE ABLE TO DC HOME WITH NO NEEDS.

## 2020-04-24 NOTE — NUR
RECIEVED CARE OF THIS PATIENT AT 1900.  PATIENT ALERT AND ORIENTED X4.  UP
WITH SBA.  HAD SHOWER THIS SHIFT.  IV IN LAC WITH FLUIDS INFUSING.  ZHENG LOWER
EXT EDEMA.  ACCUCHECK WAS 86.  SNACK GIVEN.  AFTER SHOWER IT WAS 79, SNACK
GIVEN.  C/O PAIN, MED GIVEN.  SLEPT OFF AND ON DURING NIGHT.

## 2020-04-24 NOTE — NUR
ASSUMED CARE AT 0700. PT ALERT AND ORIENTED WITH NO VOICED CONCERNS AT THE
TIME. VSS, RA. PAIN BEING CONTROLLED WITH PRN MEDS. PIV INFUSING WITHOUT
COMPLICATIONS. PT STATES SHE IS "WEAK" AND "IN PAIN". WILL CONTINUE TO
MONITOR. TOLERATING PO AND SUGARS BEING MONITORED AND TREATED PRN.

## 2020-04-24 NOTE — NUR
Assess due to high BMI 71.3, extreme class III obesity.  Hx poorly controlled
diabetes, A1C 10.6, HTN, CKD.  Endocronology following, pt requires scheduled
insulin and ss insulin.  Noted had lost insurance coverage and not receiving
all scheduled insulin at home.  Appetite is good, sometimes asking for more
food throughout day.  Wts typically average >400 lb.  Pt denied need for diet
education.  Did review allowed carbs for hospital menu selections.  Asking for
diet soda/coffee and will approve on heart healthy options for pt.  Admitted
for panus cellulitis.  Low nutrition risk.

## 2020-04-25 VITALS — DIASTOLIC BLOOD PRESSURE: 83 MMHG | SYSTOLIC BLOOD PRESSURE: 152 MMHG

## 2020-04-25 VITALS — DIASTOLIC BLOOD PRESSURE: 72 MMHG | SYSTOLIC BLOOD PRESSURE: 156 MMHG

## 2020-04-25 VITALS — DIASTOLIC BLOOD PRESSURE: 90 MMHG | SYSTOLIC BLOOD PRESSURE: 145 MMHG

## 2020-04-25 LAB
INR PPP: 3.1
PROTHROMBIN TIME: 32.1 SECONDS (ref 9.3–11.4)

## 2020-04-25 NOTE — NUR
ASSESSED AT START OF SHIFT PT A&OX4 IV INTACT AND ABX INFUISING. BLOOD SUGAR
CHECKED AND LONG ACTING INSULIN ADMINISTERED. NIGHT TIME SNACKS ALSO PROVIDED.
HYDROCODONE GIVEN FOR PAIN MED OF 8/10 IN BLE. FALL PREC IN PLACE AND CALL
LIGHT IN REACH WILL CONT WITH POC TILL EOS.

## 2020-04-25 NOTE — NUR
Assumed care of pt at 0700. Pt assumed care of pt at 0700. Pain controlled
with prn pain meds. Pt's bloog sugar 68 this afternoon. Treated, rechecked and
was 98. IVF D/C'd. Call light within reach. Report given to rosita SCOTT.

## 2020-04-25 NOTE — NUR
ASSESSMENT COMPLETED. PT CONTINUES ON IV ABTS AND IVF. C/O PAIN TO BLE,
RELIEVED BY NORCO. PT ASKS FOR SNACKS AND SODA MOST OF THE TIME, DIABETIC
EDUCATION PROVIDED.SATTING OK ON ROOM AIR. UP WITH SBA.TIGHT EDEMA TO BLE.WILL
CONTINUE WITH POC TILL EOS.

## 2020-04-26 VITALS — SYSTOLIC BLOOD PRESSURE: 156 MMHG | DIASTOLIC BLOOD PRESSURE: 83 MMHG

## 2020-04-26 VITALS — SYSTOLIC BLOOD PRESSURE: 143 MMHG | DIASTOLIC BLOOD PRESSURE: 69 MMHG

## 2020-04-26 LAB
ANION GAP SERPL CALC-SCNC: 6 MMOL/L (ref 7–16)
BUN SERPL-MCNC: 19 MG/DL (ref 7–18)
CALCIUM SERPL-MCNC: 8.7 MG/DL (ref 8.5–10.1)
CHLORIDE SERPL-SCNC: 104 MMOL/L (ref 98–107)
CO2 SERPL-SCNC: 28 MMOL/L (ref 21–32)
CREAT SERPL-MCNC: 1.3 MG/DL (ref 0.6–1)
ERYTHROCYTE [DISTWIDTH] IN BLOOD BY AUTOMATED COUNT: 16.5 % (ref 10.5–14.5)
GLUCOSE SERPL-MCNC: 161 MG/DL (ref 74–106)
HCT VFR BLD CALC: 37.4 % (ref 37–47)
HGB BLD-MCNC: 12.1 GM/DL (ref 12–15)
INR PPP: 3
MCH RBC QN AUTO: 28.2 PG (ref 26–34)
MCHC RBC AUTO-ENTMCNC: 32.4 G/DL (ref 28–37)
MCV RBC: 87.1 FL (ref 80–100)
PLATELET # BLD: 352 THOU/UL (ref 150–400)
POTASSIUM SERPL-SCNC: 5 MMOL/L (ref 3.5–5.1)
PROTHROMBIN TIME: 30.9 SECONDS (ref 9.3–11.4)
RBC # BLD AUTO: 4.3 MIL/UL (ref 4.2–5)
SODIUM SERPL-SCNC: 138 MMOL/L (ref 136–145)
WBC # BLD AUTO: 9 THOU/UL (ref 4–11)

## 2020-04-26 NOTE — NUR
Assumed pt care at 7am.Pt in chair resting without c/o.Assessment completed.
vss.Meds given as scheduled and well tolerated.Dr Obregon here,dc order noted.
Pt called this rn into her room and stated that she wasn't comfortable dc home
today.Dr Obregon notified.He said that pt has completed her doses of antibiotic
and staying longer in hospital at this period wasn't encouraged due to covid
19.Pt informed and agreed to go home today.Dc summary compile and reviewed
with pt.Rx and dc summary copy given. Pt took shower and saline loclk dc'd.
At 1430,pt dc home in [place] with spouse in stable condition.

## 2020-05-30 ENCOUNTER — HOSPITAL ENCOUNTER (INPATIENT)
Dept: HOSPITAL 35 - ER | Age: 45
LOS: 4 days | Discharge: HOME HEALTH | DRG: 872 | End: 2020-06-03
Attending: INTERNAL MEDICINE | Admitting: INTERNAL MEDICINE
Payer: COMMERCIAL

## 2020-05-30 VITALS — BODY MASS INDEX: 51.91 KG/M2 | WEIGHT: 293 LBS | HEIGHT: 62.99 IN

## 2020-05-30 VITALS — SYSTOLIC BLOOD PRESSURE: 180 MMHG | DIASTOLIC BLOOD PRESSURE: 85 MMHG

## 2020-05-30 DIAGNOSIS — Z91.14: ICD-10-CM

## 2020-05-30 DIAGNOSIS — A41.9: Primary | ICD-10-CM

## 2020-05-30 DIAGNOSIS — F41.9: ICD-10-CM

## 2020-05-30 DIAGNOSIS — E66.01: ICD-10-CM

## 2020-05-30 DIAGNOSIS — E78.5: ICD-10-CM

## 2020-05-30 DIAGNOSIS — L03.115: ICD-10-CM

## 2020-05-30 DIAGNOSIS — J44.9: ICD-10-CM

## 2020-05-30 DIAGNOSIS — K21.9: ICD-10-CM

## 2020-05-30 DIAGNOSIS — M19.90: ICD-10-CM

## 2020-05-30 DIAGNOSIS — F32.9: ICD-10-CM

## 2020-05-30 DIAGNOSIS — Z88.6: ICD-10-CM

## 2020-05-30 DIAGNOSIS — Z90.49: ICD-10-CM

## 2020-05-30 DIAGNOSIS — R65.20: ICD-10-CM

## 2020-05-30 DIAGNOSIS — E11.42: ICD-10-CM

## 2020-05-30 DIAGNOSIS — Z88.1: ICD-10-CM

## 2020-05-30 DIAGNOSIS — E11.9: ICD-10-CM

## 2020-05-30 DIAGNOSIS — Z86.718: ICD-10-CM

## 2020-05-30 DIAGNOSIS — I77.6: ICD-10-CM

## 2020-05-30 DIAGNOSIS — I10: ICD-10-CM

## 2020-05-30 DIAGNOSIS — N18.3: ICD-10-CM

## 2020-05-30 DIAGNOSIS — Z88.0: ICD-10-CM

## 2020-05-30 LAB
ANION GAP SERPL CALC-SCNC: 9 MMOL/L (ref 7–16)
APTT BLD: 31.7 SECONDS (ref 24.5–32.8)
BASOPHILS NFR BLD AUTO: 1.1 % (ref 0–2)
BUN SERPL-MCNC: 11 MG/DL (ref 7–18)
CALCIUM SERPL-MCNC: 8.5 MG/DL (ref 8.5–10.1)
CHLORIDE SERPL-SCNC: 103 MMOL/L (ref 98–107)
CO2 SERPL-SCNC: 25 MMOL/L (ref 21–32)
CREAT SERPL-MCNC: 1.3 MG/DL (ref 0.6–1)
EOSINOPHIL NFR BLD: 0.9 % (ref 0–3)
ERYTHROCYTE [DISTWIDTH] IN BLOOD BY AUTOMATED COUNT: 15.5 % (ref 10.5–14.5)
GLUCOSE SERPL-MCNC: 231 MG/DL (ref 74–106)
GRANULOCYTES NFR BLD MANUAL: 75.2 % (ref 36–66)
HCT VFR BLD CALC: 42.9 % (ref 37–47)
HGB BLD-MCNC: 14.5 GM/DL (ref 12–15)
INR PPP: 1.1
LYMPHOCYTES NFR BLD AUTO: 18.3 % (ref 24–44)
MCH RBC QN AUTO: 28.1 PG (ref 26–34)
MCHC RBC AUTO-ENTMCNC: 33.7 G/DL (ref 28–37)
MCV RBC: 83.3 FL (ref 80–100)
MONOCYTES NFR BLD: 4.5 % (ref 1–8)
NEUTROPHILS # BLD: 8.5 THOU/UL (ref 1.4–8.2)
PLATELET # BLD: 353 THOU/UL (ref 150–400)
POTASSIUM SERPL-SCNC: 3.6 MMOL/L (ref 3.5–5.1)
PROTHROMBIN TIME: 11.1 SECONDS (ref 9.3–11.4)
RBC # BLD AUTO: 5.15 MIL/UL (ref 4.2–5)
SODIUM SERPL-SCNC: 137 MMOL/L (ref 136–145)
WBC # BLD AUTO: 11.2 THOU/UL (ref 4–11)

## 2020-05-30 PROCEDURE — 10040 EXTRACTION: CPT

## 2020-05-30 PROCEDURE — 10195: CPT

## 2020-05-30 NOTE — HC
[facility] [place], [place]
Garett Oliv[place]   [address]                     CONSULTATION                  
_______________________________________________________________________________
 
Name:       KRISTY PEOPLES                Room #:         454-[facility] [place]  
M.R.#:      [id number]                       Account #:      [id number]  
Admission:  05/31/20    Attend Phys:    Johan Harvey MD    
Discharge:              Date of Birth:  [birth year]  
                                                          Report #: [id number]
                                                                    [id number]   
_______________________________________________________________________________
THIS REPORT FOR:  
 
cc:  [facility] physician unknown
     [facility] physician unknown                                       
     Enrico Peters MD                                             ~
CC: Johan Harvey
    [facility] unknown
 
DATE OF SERVICE:  05/31/2020
 
 
CONSULTATION:  Infectious diseases.
 
HISTORY OF PRESENT ILLNESS:  Kristy Peoples is a 45-year-old  female
who comes to the hospital because of a painful skin rash.  The patient states
she was in her usual state of health except for some higher than usual sugars on
fingerstick.  On May 28, she had fairly sudden onset of a painful erythematous
macular rash on her medial right knee.  This became worse and spread over the
29th.  She came to the ER on the 30th and was admitted with cellulitis versus
vasculitis.  Infectious Disease consultation was requested.
 
PAST MEDICAL HISTORY:  Significant for diabetes, hypertension and
hyperlipidemia.  The patient has COPD.  She at times had an elevated creatinine,
 past history of deep vein thrombosis and pulmonary embolus.  The patient is
significantly obese, weighing approximately 350 pounds.
 
PAST SURGICAL HISTORY:  Includes appendectomy, cholecystectomy and
tonsillectomy.  
 
ALLERGIES:  SHE HAS A HISTORY OF ALLERGY TO PENICILLIN, LEVAQUIN, AND DILAUDID.
 
FAMILY HISTORY:  Noncontributory.
 
SOCIAL HISTORY:  The patient is .  She lives with her  and
brother-in-law and his wife.  The patient was a pack to a pack and half a day
smoker, but quit about a year ago.  The ____ has continued to smoke.  The
patient denies alcohol or drug use.  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
disability because of declining health related to her obesity and COPD.
 
REVIEW OF SYSTEMS:  
The patient is not complaining of fevers, chills, sweats.
ENT:  No complaints.
CARDIOVASCULAR:  The patient denies cough, chest pain, shortness of breath.  No
angina, syncope nor palpitations.
GASTROINTESTINAL:  The patient denies nausea, vomiting, diarrhea, constipation. 
She is having some abdominal discomfort.
 
 
 
[facility] [place]
[address] [place]   [address]                     CONSULTATION                  
_______________________________________________________________________________
 
Name:       KRISTY PEOPLES                Room #:         454-P       [facility].#:      [id number]                       Account #:      [id number]  
Admission:  05/31/20    Attend Phys:    Johan Harvey MD    
Discharge:              Date of Birth:  [birth year]  
                                                          Report #: [id number]
                                                                    [id number]   
_______________________________________________________________________________
EXTREMITIES:  The patient complains of pain around her rash.
 
PHYSICAL EXAMINATION:
GENERAL:  The patient appears alert, oriented, comfortable, not in any distress.
VITAL SIGNS:  Normal.  The patient is afebrile.
SKIN:  Shows the rash on the right medial knee.  There are a series of 2 mm
almost petechial type dots, but they become confluent.  The rash measures
probably in total 11 x 11 cm in a shape somewhat like the [place]
with the lower left side having more normal skin.  The borders are somewhat
indistinct as the rash tapers off into satellite lesions.  There may be a little
bit of early rash formation around the right ankle and also some going down the
right posterior calf.  There is no adenopathy.  The patient was admitted about a
month ago for a monilial rash in the pannus.  This has mostly resolved, and has
just a little bit of residual erythema.
ENT:  Negative.
HEART:  Sounds are normal.
LUNGS:  Clear.
ABDOMEN:  Belly is obese, soft, not tender.
EXTREMITIES:  In addition to the rash is noted to have mild venous stasis
changes with 1+ edema and some early rubor.  Pulses are excellent.  Capillary
refill is good.
 
This looks most like a typical cellulitic type rash, probably staph or strep.  I
would anticipate a good response to cefazolin 3 grams IV daily.  The satellite
lesions suggest possible yeast, but the rash does not really look yeasty and the
patient is not complaining of itching.  This looks very different than the
monilial rash in the pannus.
 
For now, I would suggest treating for the stasis with elevation and compression.
 We will use IV antibiotics.  We can use Sarna lotion for topical relief as the
skin is intact without any open wounds nor drainage.  If the rash does not
improve with this regimen, we could consider skin biopsy or broader antibiotics.
 
 
 
 
 
 
 
 
 
 
 
 
                         
   By:                               
                   
D: 05/31/20 1834                           _____________________________________
T: 05/31/20 2007                           Enrico Peters MD               /nt

## 2020-05-31 VITALS — SYSTOLIC BLOOD PRESSURE: 135 MMHG | DIASTOLIC BLOOD PRESSURE: 72 MMHG

## 2020-05-31 VITALS — DIASTOLIC BLOOD PRESSURE: 87 MMHG | SYSTOLIC BLOOD PRESSURE: 175 MMHG

## 2020-05-31 VITALS — SYSTOLIC BLOOD PRESSURE: 145 MMHG | DIASTOLIC BLOOD PRESSURE: 62 MMHG

## 2020-05-31 VITALS — DIASTOLIC BLOOD PRESSURE: 89 MMHG | SYSTOLIC BLOOD PRESSURE: 140 MMHG

## 2020-05-31 VITALS — DIASTOLIC BLOOD PRESSURE: 69 MMHG | SYSTOLIC BLOOD PRESSURE: 134 MMHG

## 2020-05-31 VITALS — SYSTOLIC BLOOD PRESSURE: 140 MMHG | DIASTOLIC BLOOD PRESSURE: 89 MMHG

## 2020-05-31 LAB
BACTERIA #/AREA URNS HPF: (no result) /HPF
BILIRUB UR-MCNC: NEGATIVE MG/DL
COLOR UR: YELLOW
INR PPP: 1.1
KETONES UR STRIP-MCNC: NEGATIVE MG/DL
MUCUS: (no result) STRN/LPF
NITRITE UR QL STRIP: NEGATIVE
PROTHROMBIN TIME: 10.8 SECONDS (ref 9.3–11.4)
RBC # UR STRIP: (no result) /UL
RBC #/AREA URNS HPF: (no result) /HPF (ref 0–2)
SP GR UR STRIP: 1.01 (ref 1–1.03)
SQUAMOUS: (no result) /LPF (ref 0–3)
URINE CLARITY: CLEAR
URINE GLUCOSE-RANDOM*: (no result)
URINE LEUKOCYTES: NEGATIVE
URINE PROTEIN (DIPSTICK): (no result)
UROBILINOGEN UR STRIP-ACNC: 0.2 E.U./DL (ref 0.2–1)
WBC #/AREA URNS HPF: (no result) /HPF (ref 0–5)

## 2020-05-31 NOTE — NUR
Assumed patient care at 0715. She is up with stand-by assist. LSCTA, BS x's 4,
ABD is soft and non-tender. She continues with a bright red rash to right
lower leg. Patient has asked and received Morphine 0.5mL per IV push q
3-4 hours for "level six to seven" pain at rash site. Blood sugars were 413 at
0730 and 490 at 1230. She was given 12 Units of Sliding Scale Lispro with 54
Units of Glargine at Breakfast. Dr Hooper notified; no new orders. Dr Hooper was
notified again of 490 Blood Sugar at 1230 per verbal order (and per protocol),
patient was given 20 Units of Lispro at Lunchtime. This nurse asked patient if
she has any extra snacks in her room that could be increasing her Blood
Sugars. Patient denies having any snacks in her room; she allowed this nurse
to look in her cabinets/drawers and nothing was found. Will report to
on-coming nurse and continue to monitor this patient.

## 2020-05-31 NOTE — NUR
Pt. admitted to the unit from the emergency room accompanied by staff.  She
is alert and oriented.  Pt. has a red rash to her right medial knee and upper
calf area.  She does c/o pain to the area and po tylenol given (see emar) with
some relief.  Admission assessment and history is completed.  Up to the
bathroom with standby.

## 2020-06-01 VITALS — DIASTOLIC BLOOD PRESSURE: 74 MMHG | SYSTOLIC BLOOD PRESSURE: 126 MMHG

## 2020-06-01 VITALS — SYSTOLIC BLOOD PRESSURE: 145 MMHG | DIASTOLIC BLOOD PRESSURE: 84 MMHG

## 2020-06-01 VITALS — DIASTOLIC BLOOD PRESSURE: 71 MMHG | SYSTOLIC BLOOD PRESSURE: 141 MMHG

## 2020-06-01 VITALS — SYSTOLIC BLOOD PRESSURE: 126 MMHG | DIASTOLIC BLOOD PRESSURE: 74 MMHG

## 2020-06-01 LAB
ANION GAP SERPL CALC-SCNC: 8 MMOL/L (ref 7–16)
BASOPHILS NFR BLD AUTO: 0.4 % (ref 0–2)
BUN SERPL-MCNC: 24 MG/DL (ref 7–18)
CALCIUM SERPL-MCNC: 8.1 MG/DL (ref 8.5–10.1)
CHLORIDE SERPL-SCNC: 102 MMOL/L (ref 98–107)
CO2 SERPL-SCNC: 23 MMOL/L (ref 21–32)
CREAT SERPL-MCNC: 1 MG/DL (ref 0.6–1)
EOSINOPHIL NFR BLD: 0 % (ref 0–3)
ERYTHROCYTE [DISTWIDTH] IN BLOOD BY AUTOMATED COUNT: 15.5 % (ref 10.5–14.5)
GLUCOSE SERPL-MCNC: 312 MG/DL (ref 74–106)
GRANULOCYTES NFR BLD MANUAL: 81.5 % (ref 36–66)
HCT VFR BLD CALC: 39.1 % (ref 37–47)
HGB BLD-MCNC: 13 GM/DL (ref 12–15)
INR PPP: 1.1
LYMPHOCYTES NFR BLD AUTO: 13.1 % (ref 24–44)
MCH RBC QN AUTO: 28 PG (ref 26–34)
MCHC RBC AUTO-ENTMCNC: 33.2 G/DL (ref 28–37)
MCV RBC: 84.4 FL (ref 80–100)
MONOCYTES NFR BLD: 5 % (ref 1–8)
NEUTROPHILS # BLD: 9.1 THOU/UL (ref 1.4–8.2)
PLATELET # BLD: 349 THOU/UL (ref 150–400)
POTASSIUM SERPL-SCNC: 4.1 MMOL/L (ref 3.5–5.1)
PROTHROMBIN TIME: 10.9 SECONDS (ref 9.3–11.4)
RBC # BLD AUTO: 4.63 MIL/UL (ref 4.2–5)
SODIUM SERPL-SCNC: 133 MMOL/L (ref 136–145)
WBC # BLD AUTO: 11.2 THOU/UL (ref 4–11)

## 2020-06-01 NOTE — NUR
ORDERS RECEIVED FOR EVAL AND TREAT.  PER NURSING NOTES, THE Pt IS UP AD KEILA.
SPOKE WITH Pt WHO STATES SHE IS HAVING NO DIFFICULTY WITH HER MOBILITY AND HAS
BEEN UP ON HER OWN.  Pt DECLINING FORMAL P.T. EVAL

## 2020-06-01 NOTE — NUR
Assumed patient care at 0715. Vital signs stable. Blood sugars have improved
due to new scheduled Lispro, as well as Lispro Sliding Scale. Highest Blood
sugar was 351 at 0758. Blood Sugar 187 at 1657. Patient now has Hydrocodone
1-2 tabs as needed per pain scale. She recieved 1 tab this am, 2 tabs at 1750
for right leg pain of "seven." This medication has been effective for pain
management. Will report to on-coming nurse and continue to monitor.

## 2020-06-01 NOTE — NUR
chart review, cm consult. cm visited with michael via phone call, she a & o x 3,
and able to make her needs now. intro to cm, dcp and transition of care. "
live at home with spouse, brother and sister in law. feel safe and have
support at home if needed. has walker with seat- function properly and not
broken. 2 steps enter home with hand rail. manage own medication. have cpap
from [facility] and home o2 from am home patient. never driven before. use 5 gallon
bucket for bathing.  no rehab or hh in past. dr vasile santos if my primary
 i am very tired and weak so rehab might be ok"/michael. will cont following
as needed for dc needs.

## 2020-06-01 NOTE — NUR
PAIN CONTROLLED THIS SHIFT. PATIENT BLOOD SUGAR  AT AROUND 2100,CALLED
NP PER DELPHINE NEW ORDER TO GIVE 10 UNITS OF HUMALOG AND CHECK BLOOD SUGAR AT
NIGHT. BLOOD SUGAR  AT THIS TIME. PATIENT REEDUCATED ON CARB CONTROLLED
DIET. PATIENT IS UP AT KEILA. PATIENT IN BED ASLEEP AT THIS TIME BREATHING
REGULAR AND UNLABOURED.

## 2020-06-02 VITALS — DIASTOLIC BLOOD PRESSURE: 84 MMHG | SYSTOLIC BLOOD PRESSURE: 147 MMHG

## 2020-06-02 VITALS — SYSTOLIC BLOOD PRESSURE: 143 MMHG | DIASTOLIC BLOOD PRESSURE: 78 MMHG

## 2020-06-02 VITALS — DIASTOLIC BLOOD PRESSURE: 76 MMHG | SYSTOLIC BLOOD PRESSURE: 143 MMHG

## 2020-06-02 VITALS — SYSTOLIC BLOOD PRESSURE: 117 MMHG | DIASTOLIC BLOOD PRESSURE: 65 MMHG

## 2020-06-02 LAB
ANION GAP SERPL CALC-SCNC: 6 MMOL/L (ref 7–16)
BUN SERPL-MCNC: 26 MG/DL (ref 7–18)
CALCIUM SERPL-MCNC: 8.2 MG/DL (ref 8.5–10.1)
CHLORIDE SERPL-SCNC: 105 MMOL/L (ref 98–107)
CO2 SERPL-SCNC: 27 MMOL/L (ref 21–32)
CREAT SERPL-MCNC: 1.2 MG/DL (ref 0.6–1)
ERYTHROCYTE [DISTWIDTH] IN BLOOD BY AUTOMATED COUNT: 15.3 % (ref 10.5–14.5)
EST. AVERAGE GLUCOSE BLD GHB EST-MCNC: 263 MG/DL
GLUCOSE SERPL-MCNC: 60 MG/DL (ref 74–106)
GLYCOHEMOGLOBIN (HGB A1C): 10.8 % (ref 4.8–5.6)
HCT VFR BLD CALC: 39.8 % (ref 37–47)
HGB BLD-MCNC: 13.3 GM/DL (ref 12–15)
INR PPP: 1.2
MCH RBC QN AUTO: 28.2 PG (ref 26–34)
MCHC RBC AUTO-ENTMCNC: 33.3 G/DL (ref 28–37)
MCV RBC: 84.9 FL (ref 80–100)
PLATELET # BLD: 314 THOU/UL (ref 150–400)
POTASSIUM SERPL-SCNC: 3.5 MMOL/L (ref 3.5–5.1)
PROTHROMBIN TIME: 12.1 SECONDS (ref 9.3–11.4)
RBC # BLD AUTO: 4.7 MIL/UL (ref 4.2–5)
SODIUM SERPL-SCNC: 138 MMOL/L (ref 136–145)
WBC # BLD AUTO: 8.7 THOU/UL (ref 4–11)

## 2020-06-02 NOTE — NUR
PT DOING SOME BETTER. BLOOD GLUCOSE BETTER CONTROLLED NOW. RASH IMPROVING BUT
STILL HAVING SOME PAIN IN RT LEG. MEDS HELP. SAT UP SEVERAL HOURS. IN GOOD
SPIRITS.

## 2020-06-02 NOTE — NUR
PT TRANFERRED FROM 4W, PT IS AWAKE, ALERT AND ORIENTEDX4, MAKES NEEDS KNOWN,
DENIES COUGH, SOB, DIZZINESS OR HEADACHE, C/O PAIN ON BILATERAL LOWER
EXTREMITY, PAIN MEDICATIONS GIVEN, NO FURTHER COMPLAINS, MEDICATIONS GIVEN AS
ORDERED, RESTING IN BED AT THIS TIME, NO CONCERNS VOICED, WILL CONTINUE TO
MONITOR

## 2020-06-02 NOTE — NUR
ON-GOING ASSESSMENT: CM REVIEWED CHART AND SPOKE WITH PATIENT. PT CONTINUES TO
BE ON IV ANBX FOR VASCULITIS. PT ALSO REPORTS THAT SHE HAS A GLUCOMETER AT
HOME AS WELL AS ALL HER SUPPLIES FOR INSULIN. PT STATES THAT SHE HAS NOT HAD
HH IN THE PAST NOR BEEN TO AN ACUTE REHAB. CM DISCUSSED THAT PT DOES NOT
QUALIFY FOR HH THERAPY AS SHE HAS MEDICAID BUT IF SHE FELT SHE NEEDED EXTRA
THERAPY WE COULD LOOK INTO POSSIBLE ACUTE REHABS.  PT REPORTS SHE IS GETTING
AROUND OK IN HER ROOM AND DOES NOT FEEL SHE WOULD ACUTE REHAB AT THIS TIME.
CM WILL CONTINUE TO FOLLOW TO ASSIST AS NEEDED.

## 2020-06-02 NOTE — HC
[facility]
Garett Olivier
[place]   [address]                     CONSULTATION                  
_______________________________________________________________________________
 
Name:       EDSON BELL                Room #:         444-[facility] [place]  
M.R.#:      [id number]                       Account #:      [id number]  
Admission:  05/31/20    Attend Phys:    Carlyle Hooper MD      
Discharge:              Date of Birth:  [birth year]  
                                                          Report #: [id number]
                                                                    [id number]   
_______________________________________________________________________________
THIS REPORT FOR:  
 
cc:  [facility] physician unknown
     [facility] physician unknown                                       
     Verito Peña MD                                         ~
CC: [facility] unknown
    Carlyle Hooper
 
DATE OF SERVICE:  06/01/2020
 
 
CONSULTING PHYSICIAN:  Dr. Harvey.
 
REASON FOR CONSULTATION:  Uncontrolled type 2 diabetes mellitus.
 
HISTORY OF PRESENT ILLNESS:  This is a 45-year-old female patient whose medical
background is significant for multiple medical issues including longstanding
type 2 diabetes mellitus, morbid obesity, hyperlipidemia, hypertension, stage 3
chronic kidney disease as well as peripheral diabetic neuropathy.  The patient
presented yesterday due to complaints of right flank pain as well as a right leg
rash and discomfort.  The patient was admitted for further care and monitoring.
 
The patient's most recent antidiabetic regimen had consisted of Lantus insulin
54 units twice a day in addition to Humalog insulin 38 units t.i.d. a.c.  She
reports that her blood glucose control had been rather adequate on this regimen
without the issues of hypoglycemia.  However, over the few days that preceded
her presentation and as she was feeling sicker, her blood glucose values have
risen gradually.  Her blood glucose was over 400 mg/dL on presentation.
 
Again, the patient's background is noted for stage 3 chronic kidney disease as
well as baseline peripheral diabetic neuropathy, but not heart disease.
 
The patient is also known to have hypertension and is maintained on amlodipine
10 mg daily as well as furosemide 40 mg daily.  She also has hyperlipidemia and
is maintained on simvastatin 20 mg at bedtime.
 
REVIEW OF SYSTEMS:
CONSTITUTIONAL:  Fatigue, tiredness, but not fever or chills or body weight
changes.
HEENT:  Negative for sore throat, sinus pain, ear drainage.
PULMONARY:  Occasional shortness of breath and cough, but no hemoptysis.
CARDIAC:  Negative for chest pain, syncope or presyncope or frequent significant
palpitations.
GASTROINTESTINAL:  Noted for occasional abdominal discomfort, distention, and
intermittent nausea, but not vomiting.
NEUROLOGY:  Baseline peripheral diabetic neuropathy, but negative for seizure
 
 
 
[address]   [address]                     CONSULTATION                  
_______________________________________________________________________________
 
Name:       EDSON BELL                Room #:         444-P       [facility] [place]  
M.R.#:      [id number]                       Account #:      [id number]  
Admission:  05/31/20    Attend Phys:    Carlyle Hooper MD      
Discharge:              Date of Birth:  [birth year]  
                                                          Report #: [id number]
                                                                    [id number]   
_______________________________________________________________________________
activity or frequent severe headaches, no loss of consciousness.  Otherwise,
review of systems noncontributory unless mentioned in HPI.
 
PAST MEDICAL HISTORY:
1.  Type 2 diabetes mellitus.
2.  Hypertension.
3.  Hyperlipidemia.
4.  Peripheral diabetic neuropathy.
5.  Morbid obesity.
6.  Chronic kidney disease stage 3.
7.  Depression.
8.  Chronic obstructive pulmonary disease.
9.  Anxiety.
10.  Gastroesophageal reflux disease.
11.  History of transient ischemic attack.
12.  Deep venous thrombosis of the lower extremity.
13.  Osteoarthritis.
14.  History of cellulitis.
 
PAST SURGICAL HISTORY:  Appendectomy, cholecystectomy, 2 hernia repairs.
 
OUTPATIENT MEDICATIONS:  Include Lantus insulin 54 units b.i.d., Humalog insulin
38 units t.i.d. a.c., albuterol p.r.n., warfarin 5 mg daily, vitamin D3 25 mcg
daily, loratadine 10 mg at bedtime, diltiazem 120 mg daily, Cymbalta 30 mg
t.i.d., gabapentin 300 mg b.i.d., ranitidine 150 mg b.i.d., Requip 1 mg at
bedtime, trazodone 50 mg at bedtime, Elavil 50 mg at bedtime, Singulair 10 mg
daily, simvastatin 20 mg at bedtime, multivitamin daily, spironolactone 25 mg
b.i.d., alprazolam 0.25 mg t.i.d., omeprazole b.i.d., bupropion  mg daily.
 
ALLERGIES:  The patient is allergic to HYDROMORPHONE, LEVOFLOXACIN AND
PENICILLIN.
 
FAMILY HISTORY:  Noncontributory.
 
SOCIAL HISTORY:  The patient denies use of tobacco, alcohol or illicit drugs.
 
PHYSICAL EXAMINATION:
GENERAL:  Pleasant  female patient who is not in apparent pain or
distress.
VITAL SIGNS:  Blood pressure is 141/71 mmHg, heart rate is 88 beats per minute,
respiration 18 per minute, temperature is 36.4 degrees Celsius.
PSYCH:  The patient is sitting upright in her chair, does not appear to be in
pain or distress.
HEENT:  Anicteric sclerae.  Intact extraocular motions.
NECK:  Supple, without carotid bruits or thyromegaly.
CHEST:  Noted for distant breath sounds, scattered rales, but not wheezes or
 
 
 
[facility]
[address] [place]   [address]                     CONSULTATION                  
_______________________________________________________________________________
 
Name:       EDSON BELL                Room #:         444-P       [facility] [place]  
MARCOS#:      [id number]                       Account #:      [id number]  
Admission:  05/31/20    Attend Phys:    Carlyle Hooper MD      
Discharge:              Date of Birth:  [birth year]  
                                                          Report #: [id number]
                                                                    [id number]   
_______________________________________________________________________________
crackles.
HEART:  Regular rate and rhythm without murmurs or gallops.
ABDOMEN:  Soft, lax.  No guarding.  Active bowel sounds.
EXTREMITIES:  Lower extremity exam is noted for trace edema bilaterally with
deep skin pigmentation consistent with cellulitic changes over the right lower
extremity.
NEUROLOGIC:  Awake, alert and oriented to time, place and person.  The remainder
of her examination is noted for peripheral sensory deficits.
PSYCHIATRIC:  Interactive, pleasant.  Normal thought process.  Normal mood and
affect.
 
LABORATORY DATA:  On arrival to the ER, her blood glucose was 413 and shortly
after 490 mg/dL.  Her most recent was 351 mg/dL.  Sodium 133, potassium 4.1,
chloride 102, CO2 of 23, anion gap 8, BUN 24, creatinine 1.0, AST of 14, total
bilirubin 0.3, calcium 8.1, magnesium 1.9, alkaline phosphatase 167, ALT 20,
total protein 6.0, albumin 2.7, EGFR 60.  Lactic acid 1.4.  Troponin negative. 
INR 1.1.  White blood count 11.2, hemoglobin 13.0, hematocrit 39.1, platelets
349.  Hemoglobin A1c on 04/22/2020 was at 10.6%.
 
ASSESSMENT AND PLAN:
1.  Type 2 diabetes mellitus.  The patient has an uncontrolled baseline judging
by her previous recent hospitalization as well as her recently measured
hemoglobin A1c.  By her report, the patient has done well on the above stated
insulin regimen of basal bolus insulin.  However, she has had recent worsening
of hyperglycemia, likely in the context of an active infection.  That said, I
will go towards raising her insulin doses to accommodate this acute worsening
with the hope that this would not need to be a longstanding measure.  That said,
I will change her Lantus insulin from 54-64 units twice a day as well as raising
her Humalog dosage from 38-44 units t.i.d. a.c.  I will continue with Humalog
supplemental scale support at moderate intensity and maintain blood glucose
monitoring a.c. and at bedtime to conduct further therapeutic changes as needed.
2.  Hypertension.  The patient's level of blood pressure control is adequate for
the time being.  She is to continue with the current regimen of diltiazem.
3.  Hyperlipidemia.  The patient is currently on atorvastatin therapy and
tolerates it well, she is to continue with the same.
4.  Peripheral diabetic neuropathy.  The patient is maintained on a regimen of
amitriptyline, gabapentin, and Cymbalta.  She has adequate control on this
regimen.  She is to continue with the same.
 
I certainly appreciate this consultation by Dr. Harvey.
 
 
 
 
  <ELECTRONICALLY SIGNED>
   By: Verito Peña MD         
  06/02/20     0956
D: 06/01/20 1123                           _____________________________________
T: 06/01/20 1348                           Verito Peña MD           /nt

## 2020-06-03 VITALS — SYSTOLIC BLOOD PRESSURE: 132 MMHG | DIASTOLIC BLOOD PRESSURE: 81 MMHG

## 2020-06-03 VITALS — DIASTOLIC BLOOD PRESSURE: 84 MMHG | SYSTOLIC BLOOD PRESSURE: 137 MMHG

## 2020-06-03 VITALS — SYSTOLIC BLOOD PRESSURE: 137 MMHG | DIASTOLIC BLOOD PRESSURE: 84 MMHG

## 2020-06-03 LAB
INR PPP: 1.3
PROTHROMBIN TIME: 13.1 SECONDS (ref 9.3–11.4)

## 2020-06-03 NOTE — NUR
ON-GOING ASSESSMENT: CM REVIEWED CHART AND SPOKE WITH PATIENT. PT HAS ORDERS
TO DISCHARGE HOME TODAY WITH HH. CM DISCUSSED WITH PT WHO STATES SHE WAS
HOPING SHE COULD GO TO A FACILITY DUE TO HER PAIN. CM DISCUSSED PT HAS
MEDICAID SO SHE CANNOT GO TO A SNF. PT ASKED IF SHE COULD GO TO 5N ACUTE
REHAB. OT VARIANCED PATIENT AS PT STATED SHE FELT SHE WAS BACK TO BASELINE. CM
ALSO RELAYED PTS REQUEST TO ATTENDING. PT IS LIKELY TOO HIGH LEVEL FOR 5N. CM
SPOKE WITH PT AGAIN AND DISCUSSED HH AND PT STATED SHE DOES NOT FEEL SHE NEEDS
HH OR ANYONE TO COME TO HER HOME. PT STATES HER  IS THERE TO HELP HER
AND SHE CAN WRAP HER OWN LEGS. CM ENCOURAGED HH AS RN CAN LOOK AFTER PATIENTS
LEGS AND MEDICATIONS/ETC. PT CONTINUES TO DECLINE HH AT THIS TIME. CM NOTIFIED
ATTENDING OF PTS REFUSAL FOR HH.

## 2020-06-03 NOTE — NUR
RECIEVED CARE OF THIS PATIENT AT 1900.  PATIENT ALERT AND ORIENTED X4.
PATIENT UP IN CHAIR PART OF NIGHT.  ACCUCHECK AT 2014 WAS 89.  NO COVERAGE
GIVEN.  GLARGINE INSULIN HELD ALSO PER NP.  AT 2224 ACCUCHECK WAS 84.
SANDWICH WAS GIVEN AFTER BOTH OF THESE RESULTS.  AT 0038 THE ACCUCHECK WAS
ONLY 96.  PATIENT NOT A FALL RISK.  C/O PAIN, MED GIVEN.

## 2020-06-22 ENCOUNTER — HOSPITAL ENCOUNTER (INPATIENT)
Dept: HOSPITAL 35 - ER | Age: 45
LOS: 3 days | Discharge: HOME | DRG: 175 | End: 2020-06-25
Attending: INTERNAL MEDICINE | Admitting: INTERNAL MEDICINE
Payer: COMMERCIAL

## 2020-06-22 VITALS — HEIGHT: 62.99 IN | WEIGHT: 293 LBS | BODY MASS INDEX: 51.91 KG/M2

## 2020-06-22 VITALS — SYSTOLIC BLOOD PRESSURE: 140 MMHG | DIASTOLIC BLOOD PRESSURE: 65 MMHG

## 2020-06-22 VITALS — DIASTOLIC BLOOD PRESSURE: 71 MMHG | SYSTOLIC BLOOD PRESSURE: 146 MMHG

## 2020-06-22 VITALS — SYSTOLIC BLOOD PRESSURE: 144 MMHG | DIASTOLIC BLOOD PRESSURE: 74 MMHG

## 2020-06-22 VITALS — SYSTOLIC BLOOD PRESSURE: 147 MMHG | DIASTOLIC BLOOD PRESSURE: 91 MMHG

## 2020-06-22 DIAGNOSIS — F41.9: ICD-10-CM

## 2020-06-22 DIAGNOSIS — Z88.1: ICD-10-CM

## 2020-06-22 DIAGNOSIS — D68.59: ICD-10-CM

## 2020-06-22 DIAGNOSIS — N18.3: ICD-10-CM

## 2020-06-22 DIAGNOSIS — E78.5: ICD-10-CM

## 2020-06-22 DIAGNOSIS — E43: ICD-10-CM

## 2020-06-22 DIAGNOSIS — Z90.49: ICD-10-CM

## 2020-06-22 DIAGNOSIS — Z86.718: ICD-10-CM

## 2020-06-22 DIAGNOSIS — E11.22: ICD-10-CM

## 2020-06-22 DIAGNOSIS — I12.9: ICD-10-CM

## 2020-06-22 DIAGNOSIS — F32.9: ICD-10-CM

## 2020-06-22 DIAGNOSIS — E11.42: ICD-10-CM

## 2020-06-22 DIAGNOSIS — E66.01: ICD-10-CM

## 2020-06-22 DIAGNOSIS — Z91.19: ICD-10-CM

## 2020-06-22 DIAGNOSIS — Z88.6: ICD-10-CM

## 2020-06-22 DIAGNOSIS — J44.9: ICD-10-CM

## 2020-06-22 DIAGNOSIS — E87.1: ICD-10-CM

## 2020-06-22 DIAGNOSIS — Z88.0: ICD-10-CM

## 2020-06-22 DIAGNOSIS — Z79.899: ICD-10-CM

## 2020-06-22 DIAGNOSIS — I26.99: Primary | ICD-10-CM

## 2020-06-22 DIAGNOSIS — K21.9: ICD-10-CM

## 2020-06-22 LAB
ALBUMIN SERPL-MCNC: 2.3 G/DL (ref 3.4–5)
ALT SERPL-CCNC: 11 U/L (ref 30–65)
ANION GAP SERPL CALC-SCNC: 7 MMOL/L (ref 7–16)
APTT BLD: 41.3 SECONDS (ref 24.5–32.8)
AST SERPL-CCNC: 13 U/L (ref 15–37)
BASOPHILS NFR BLD AUTO: 1.2 % (ref 0–2)
BILIRUB SERPL-MCNC: 0.3 MG/DL (ref 0.2–1)
BUN SERPL-MCNC: 11 MG/DL (ref 7–18)
CALCIUM SERPL-MCNC: 8.8 MG/DL (ref 8.5–10.1)
CHLORIDE SERPL-SCNC: 99 MMOL/L (ref 98–107)
CO2 SERPL-SCNC: 27 MMOL/L (ref 21–32)
CREAT SERPL-MCNC: 1.1 MG/DL (ref 0.6–1)
D DIMER PPP FEU-MCNC: 0.23 UG/MLFEU (ref 0.19–0.5)
EOSINOPHIL NFR BLD: 1.2 % (ref 0–3)
ERYTHROCYTE [DISTWIDTH] IN BLOOD BY AUTOMATED COUNT: 15.3 % (ref 10.5–14.5)
GLUCOSE SERPL-MCNC: 350 MG/DL (ref 74–106)
GRANULOCYTES NFR BLD MANUAL: 71 % (ref 36–66)
HCT VFR BLD CALC: 42.4 % (ref 37–47)
HGB BLD-MCNC: 14.2 GM/DL (ref 12–15)
INR PPP: 1.4
LIPASE: 57 U/L (ref 73–393)
LYMPHOCYTES NFR BLD AUTO: 22.1 % (ref 24–44)
MCH RBC QN AUTO: 27.6 PG (ref 26–34)
MCHC RBC AUTO-ENTMCNC: 33.5 G/DL (ref 28–37)
MCV RBC: 82.4 FL (ref 80–100)
MONOCYTES NFR BLD: 4.5 % (ref 1–8)
NEUTROPHILS # BLD: 6.1 THOU/UL (ref 1.4–8.2)
PLATELET # BLD: 336 THOU/UL (ref 150–400)
POTASSIUM SERPL-SCNC: 4 MMOL/L (ref 3.5–5.1)
PROT SERPL-MCNC: 6 G/DL (ref 6.4–8.2)
PROTHROMBIN TIME: 14.8 SECONDS (ref 9.3–11.4)
RBC # BLD AUTO: 5.15 MIL/UL (ref 4.2–5)
SODIUM SERPL-SCNC: 133 MMOL/L (ref 136–145)
TROPONIN I SERPL-MCNC: <0.06 NG/ML (ref ?–0.06)
WBC # BLD AUTO: 8.6 THOU/UL (ref 4–11)

## 2020-06-22 PROCEDURE — 5A09457 ASSISTANCE WITH RESPIRATORY VENTILATION, 24-96 CONSECUTIVE HOURS, CONTINUOUS POSITIVE AIRWAY PRESSURE: ICD-10-PCS | Performed by: INTERNAL MEDICINE

## 2020-06-22 PROCEDURE — 10045: CPT

## 2020-06-23 VITALS — DIASTOLIC BLOOD PRESSURE: 71 MMHG | SYSTOLIC BLOOD PRESSURE: 144 MMHG

## 2020-06-23 VITALS — SYSTOLIC BLOOD PRESSURE: 100 MMHG | DIASTOLIC BLOOD PRESSURE: 48 MMHG

## 2020-06-23 LAB
ANION GAP SERPL CALC-SCNC: 6 MMOL/L (ref 7–16)
BUN SERPL-MCNC: 14 MG/DL (ref 7–18)
CALCIUM SERPL-MCNC: 8.4 MG/DL (ref 8.5–10.1)
CHLORIDE SERPL-SCNC: 96 MMOL/L (ref 98–107)
CO2 SERPL-SCNC: 28 MMOL/L (ref 21–32)
CREAT SERPL-MCNC: 1.4 MG/DL (ref 0.6–1)
ERYTHROCYTE [DISTWIDTH] IN BLOOD BY AUTOMATED COUNT: 15.2 % (ref 10.5–14.5)
GLUCOSE SERPL-MCNC: 356 MG/DL (ref 74–106)
HCT VFR BLD CALC: 40.6 % (ref 37–47)
HGB BLD-MCNC: 13.6 GM/DL (ref 12–15)
INR PPP: 1.3
MCH RBC QN AUTO: 27.9 PG (ref 26–34)
MCHC RBC AUTO-ENTMCNC: 33.5 G/DL (ref 28–37)
MCV RBC: 83.1 FL (ref 80–100)
PLATELET # BLD: 350 THOU/UL (ref 150–400)
POTASSIUM SERPL-SCNC: 3.9 MMOL/L (ref 3.5–5.1)
PROTHROMBIN TIME: 13.5 SECONDS (ref 9.3–11.4)
RBC # BLD AUTO: 4.88 MIL/UL (ref 4.2–5)
SODIUM SERPL-SCNC: 130 MMOL/L (ref 136–145)
TROPONIN I SERPL-MCNC: <0.06 NG/ML (ref ?–0.06)
WBC # BLD AUTO: 9.1 THOU/UL (ref 4–11)

## 2020-06-23 NOTE — NUR
PT ADMITTED RELATED TO BILATERAL PE, LEG PAIN. CM REVIEWED CHART AND SPOKE
WITH CARE TEAM. CM CALLED AND SPOKE WITH PT AT BEDSIDE THIS DAY. PT APPERED TO
BE A&O X4. CM ROLE INTRODUCED. PT INDICATED SHE LIVES IN A HOUSE WITH HER
SPOUSE, BROTHER, AND SISTER IN LAW WITH 2 STEPS TO ENTER AND NO STEPS INSIDE.
PT INDICATED SHE HAS A 4WW, A CPAP THROUGH [facility], AND HOME A2 AT 2L
CONTINUOUSE PTA THROUGH [facility] PT. PT HAD BEEN HERE AND DC'S HOME 6/03 TO
SELF CARE AFTER REFUSING HH SERVICES. PT INDICATED SHE PLANS TO RETURN HOME
ONCE MEDICALLY STABLE. PT INDICATED SHE WOULD SEE HOW SHE FELT ABOUT HH AS DC
NEARS. CM TO FOLLOW AS INDICATED WITH DC PLANNING.

## 2020-06-23 NOTE — NUR
ASSESSMENT AS DOCUMENTED. VSS. SR ON THE MONITOR. PT RESTING IN CHAIR WITH
CALL LIGHT IN REACH. WILL CONTINUE TO MONITOR.

## 2020-06-23 NOTE — NUR
Pt with class III extreme obesity, BMI 68.9.  Recently discharged, now
admitted with chest pain and PE.  Hx HTN, DM poorly controlled.  Last A1C
10.8.  Wts are highly variable and pt with 3+ bilateral lower extremity
edema.  Has carb control diet ordered, aware of alternative menu.  Low
nutrition risk

## 2020-06-23 NOTE — 2DMMODE
[facility]
[address]
[place]   [address]                   2 D/M-MODE ECHOCARDIOGRAM     
_______________________________________________________________________________
 
Name:       EDSON BELL                Room #:         449-I       ADM IN  
M.R.#:      [id number]                       Account #:      [id number]  
Admission:  06/22/20    Attend Phys:    Carlyle Hooper MD      
Discharge:              Date of Birth:  [birth year]  
                                                          Report #: [id number]
                                                                    [id number]
_______________________________________________________________________________
THIS REPORT FOR:  
 
cc:  FAM - No family physician/PCP 
     FAM - No family physician/PCP 
     Néstor Pryor MD [facility]                                        
                                                                       ~
 
--------------- APPROVED REPORT --------------
 
 
Study performed:  06/23/2020 07:51:25
 
EXAM: Comprehensive 2D, Doppler, and color-flow 
Echocardiogram 
Patient Location: Bedside   
      Status:  routine
 
      BSA:         2.54
HR: 90 bpm BP:          144/74 mmHg 
Rhythm: NSR     
 
Other Information 
Study Quality: Technically Difficult
Technically limited study due to  body habitus, inability to position 
patient.
 
Indications
Pulmonary Embolism
Chest Pain
Morbid obesity
 
Echo Enhancing Agent
Indication: Endocardial border delineation
Agent(s) / Amount(s) Used: Optison 4 cc
 
Aortic Valve
AoV Peak Domenic.:  1.03 m/s 
AO Peak Gr.:  4.20 mmHg  
 
Pulmonary Valve
PV Peak Domenic.:  1.18 m/s PV Peak Gr.:  5.57 mmHg
 
Left Ventricle
The left ventricle is normal size. There is normal LV segmental wall 
motion. There is normal left ventricular wall thickness. The left 
ventricular systolic function is normal. The left ventricular 
 
 
[facility]
[address]
[place]  [address]
Phone:  [phone] 2 D/M-MODE ECHOCARDIOGRAM     
_______________________________________________________________________________
 
Name:            EDSON BELL                Room #:        449-I       ADM IN
M.R.#:           [id number]          Account #:     [id number]  
Admission:       06/22/20         Attend Phys:   Carlyle Hooper MD  
Discharge:                  Date of Birth: [birth year]  
                         Report #:      [id number]
        [id number]
_______________________________________________________________________________
ejection fraction is within the normal range. LVEF is 55-60%. This 
study is not technically sufficient to allow evaluation of the LV 
diastolic function.
 
Right Ventricle
Right ventricle is not well visualized. Right ventricular systolic 
function is grossly normal.
 
Atria
The left atrium size is normal. Right atrium is not well 
visualized.
 
Aortic Valve
The aortic valve is not well visualized. No aortic regurgitation is 
present. There is no aortic valvular stenosis.
 
Mitral Valve
The mitral valve is normal in structure. There is no mitral valve 
regurgitation noted. No evidence of mitral valve stenosis.
 
Tricuspid Valve
Tricuspid valve is not well visualized. There is no tricuspid valve 
regurgitation noted.
 
Pulmonic Valve
Pulmonic valve is not well visualized. There is no pulmonic valvular 
regurgitation.
 
Great Vessels
The aortic root is normal in size. The inferior vena cava is not well 
visualized.
 
Pericardium
There is no pericardial effusion.
 
<Conclusion>
Very limited study
The left ventricular systolic function is normal.
There is normal LV segmental wall motion.  LVEF 55-60%.
The aortic valve is not well visualized.  No aortic stenosis or 
insufficiency.
The mitral valve is normal in structure.  No mitral valve 
regurgitation
Pulmonary artery systolic pressure could not be reliably 
 
 
[facility]
[address]
[place]  [address]
Phone:  [phone] 2 D/M-MODE ECHOCARDIOGRAM     
_______________________________________________________________________________
 
Name:            EDSON BELL JOSIE                Room #:        449-I       ADM IN
M.R.#:           [id number]          Account #:     [id number]  
Admission:       06/22/20         Attend Phys:   Carlyle Hooper MD  
Discharge:                  Date of Birth: [birth year]  
                         Report #:      [id number]
        [id number]
_______________________________________________________________________________
ascertained.
There is no pericardial effusion.
 
 
 
 
 
 
 
 
 
 
 
 
 
 
 
 
 
 
 
 
 
 
 
 
 
 
 
 
 
 
 
 
 
 
 
 
 
 
 
 
 
 
  <ELECTRONICALLY SIGNED>
   By: Néstor Pryor MD, FAC   
  06/23/20 0919
D: 06/23/20 0919                           _____________________________________
T: 06/23/20 0919                           Néstor Pryor MD, FACC     /INF

## 2020-06-23 NOTE — EKG
[facility]
[address]
[place]   [address]                     ELECTROCARDIOGRAM REPORT      
_______________________________________________________________________________
 
Name:       EDSON BELL                Room #:         449-I       ADM IN  
M.R.#:      [id number]                       Account #:      [id number]  
Admission:  20    Attend Phys:    Carlyle Hooper MD      
Discharge:              Date of Birth:  [birth year]  
                                                          Report #: [id number]
                                                                    [id number]
_______________________________________________________________________________
THIS REPORT FOR:  
 
cc:  VICENTE [facility] family physician/PCP 
     VICENTE - [place] family physician/PCP 
     Néstor Pryor MD [facility]
THIS REPORT FOR:   //name//                          
 
                         [facility] ED
                                       
Test Date:    2020               Test Time:    17:33:58
Pat Name:     EDSON BELL             Department:   
Patient ID:   [id number]            Room:         [facility]
Gender:       F                        Technician:   FIDENCIO
:          1975               Requested By: Evelia Lanza
Order Number: [id number] MD:   Néstor Pryor
                                 Measurements
Intervals                              Axis          
Rate:         110                      P:            48
NH:           139                      QRS:          40
QRSD:         125                      T:            -10
QT:           333                                    
QTc:          451                                    
                           Interpretive Statements
Sinus tachycardia
Inferior infarct, old
Baseline wander in lead(s) V1,V2
Compared to ECG 02/10/2020 17:35:30
No significant change was found
Electronically Signed On 2020 8:04:07 CDT by Néstor Pryor
[email or web address]
 
 
 
 
 
 
 
 
 
 
 
 
 
 
  <ELECTRONICALLY SIGNED>
   By: Néstor Pryor MD, [facility]   
  20     0804
D: 20 1733                           _____________________________________
T: 20 1733                           Néstor Pryor MD, [facility]     /EPI

## 2020-06-24 VITALS — SYSTOLIC BLOOD PRESSURE: 128 MMHG | DIASTOLIC BLOOD PRESSURE: 84 MMHG

## 2020-06-24 VITALS — DIASTOLIC BLOOD PRESSURE: 74 MMHG | SYSTOLIC BLOOD PRESSURE: 144 MMHG

## 2020-06-24 VITALS — SYSTOLIC BLOOD PRESSURE: 126 MMHG | DIASTOLIC BLOOD PRESSURE: 76 MMHG

## 2020-06-24 LAB
ANION GAP SERPL CALC-SCNC: 5 MMOL/L (ref 7–16)
BUN SERPL-MCNC: 15 MG/DL (ref 7–18)
CALCIUM SERPL-MCNC: 8.4 MG/DL (ref 8.5–10.1)
CHLORIDE SERPL-SCNC: 95 MMOL/L (ref 98–107)
CO2 SERPL-SCNC: 28 MMOL/L (ref 21–32)
CREAT SERPL-MCNC: 1.3 MG/DL (ref 0.6–1)
GLUCOSE SERPL-MCNC: 286 MG/DL (ref 74–106)
MAGNESIUM SERPL-MCNC: 1.6 MG/DL (ref 1.8–2.4)
POTASSIUM SERPL-SCNC: 3.8 MMOL/L (ref 3.5–5.1)
SODIUM SERPL-SCNC: 128 MMOL/L (ref 136–145)

## 2020-06-24 NOTE — NUR
PT AND OT INDICATED THAT PT WOULD LIKELY E SAFE TO RETURN HOME ONCE MEDICALLY
STABLE. PT IS UP AD KEILA IN HER ROOM. CM TO FOLLOW AS INDICATED WITH DC
PLANNING.

## 2020-06-24 NOTE — NUR
ASSUMED CARE OF PT AT 1900HRS. PT AOX4 AND LETS NEEDS BE KNOWN. FALL
PRECAUTION IN PLACE. PT REPORTED SOME PAIN AND WAS TREATED WITH PRNS. PT
DENIED SOA OR NAUSEA. ASSESSMENT AS CHARTED. PT WAS ABLE TO SLEEP PART OF THE
SHIFT WITH CPAP ON. VSS AND NO S/S OF ACUTE DISTRESS. WILL CONTINUE TO
MONITOR.

## 2020-06-24 NOTE — NUR
Received awake on bed. Due medications given as prescribed, able to swallow
meds w/o difficulty.Vital signs stable. On telemetry, strips
attached to chart; SR-ST; no complaints of chest pain, crushing and heaviness
sensation. On room air during daytime, CPAP at night. On carb controlled diet;
tolerating well; no nausea, no vomiting and no abdominal pain noted. On blood
sugar monitoring-taken and recorded- with sliding scale insulin ordered-given
as prescribed. Continent of bowel and bladder; able to go to the toilet with
standby assist; using walker and gait belt. Falls bundle in place, assisted in
ADLs. With SL at L FA- intact and flushing well.
Complained of pain, due PRN pain meds given as prescribed-with partial relief.
Complained of nausea, PRN anti emetic given as prescribed, with complete
relief.
To continue monitoring patient.

## 2020-06-25 VITALS — DIASTOLIC BLOOD PRESSURE: 80 MMHG | SYSTOLIC BLOOD PRESSURE: 149 MMHG

## 2020-06-25 LAB
ANION GAP SERPL CALC-SCNC: 4 MMOL/L (ref 7–16)
BUN SERPL-MCNC: 16 MG/DL (ref 7–18)
CALCIUM SERPL-MCNC: 8.8 MG/DL (ref 8.5–10.1)
CHLORIDE SERPL-SCNC: 101 MMOL/L (ref 98–107)
CO2 SERPL-SCNC: 27 MMOL/L (ref 21–32)
CREAT SERPL-MCNC: 1.2 MG/DL (ref 0.6–1)
GLUCOSE SERPL-MCNC: 205 MG/DL (ref 74–106)
POTASSIUM SERPL-SCNC: 4.2 MMOL/L (ref 3.5–5.1)
SODIUM SERPL-SCNC: 132 MMOL/L (ref 136–145)

## 2020-06-25 NOTE — NUR
PT IS A&OX3, PT 'S VS ARE STABLE , PT GETS UP WITH WALKER TO BATHROOM, PT
DENIES SOB , PT'S PAIN CAN CONTROL BY MEDICATIONS, RN HAS  RECEIVED ORDER TO
DC PT TO HOME, RN HAS GIVING DC TEACHING , PT UNDERSTANDS WELL , PT'S 
WILL PICK PT TO HOME SOON.

## 2020-06-25 NOTE — NUR
PATIENT AOX4 MAKES NEEDS KNOWN. PAIN CONTROLLED THIS SHIFT, PATIENT HAD A
SHOWER THIS SHIFT. CALL LIGHT AND PERSONAL ITEM WITHIN REACH.PATIENT IN BED
ASLEEP AT THIS TIME BREATHING REGULAR AND UNLABOURED.

## 2020-06-25 NOTE — NUR
CARE TEAM INDIATED THAT PT IS MEDICALLY STABLE TO DC HOME THIS DAY. PT HAD
REFUSED HH UPON PREVIOUS DISCHARGES SO PT IT TO BE DISCHARGED HOME TO SELF
CARE. NO OTHER CM INTERVENTION INDICATED. CASE CLOSED.

## 2020-07-01 ENCOUNTER — HOSPITAL ENCOUNTER (EMERGENCY)
Dept: HOSPITAL 35 - ER | Age: 45
LOS: 1 days | Discharge: HOME | End: 2020-07-02
Payer: COMMERCIAL

## 2020-07-01 VITALS — BODY MASS INDEX: 32.78 KG/M2 | HEIGHT: 63 IN | WEIGHT: 185.01 LBS

## 2020-07-01 DIAGNOSIS — M54.5: ICD-10-CM

## 2020-07-01 DIAGNOSIS — Z88.1: ICD-10-CM

## 2020-07-01 DIAGNOSIS — E78.5: ICD-10-CM

## 2020-07-01 DIAGNOSIS — E11.22: ICD-10-CM

## 2020-07-01 DIAGNOSIS — Z88.5: ICD-10-CM

## 2020-07-01 DIAGNOSIS — F17.210: ICD-10-CM

## 2020-07-01 DIAGNOSIS — B37.2: Primary | ICD-10-CM

## 2020-07-01 DIAGNOSIS — I12.9: ICD-10-CM

## 2020-07-01 DIAGNOSIS — J44.9: ICD-10-CM

## 2020-07-01 DIAGNOSIS — Z79.899: ICD-10-CM

## 2020-07-01 DIAGNOSIS — N18.3: ICD-10-CM

## 2020-07-01 DIAGNOSIS — K21.9: ICD-10-CM

## 2020-07-01 DIAGNOSIS — Z79.4: ICD-10-CM

## 2020-07-01 DIAGNOSIS — Z88.0: ICD-10-CM

## 2020-07-01 LAB
ALBUMIN SERPL-MCNC: 2.1 G/DL (ref 3.4–5)
ALT SERPL-CCNC: 16 U/L (ref 30–65)
ANION GAP SERPL CALC-SCNC: 10 MMOL/L (ref 7–16)
AST SERPL-CCNC: 14 U/L (ref 15–37)
BILIRUB SERPL-MCNC: 0.1 MG/DL (ref 0.2–1)
BILIRUB UR-MCNC: (no result) MG/DL
BUN SERPL-MCNC: 14 MG/DL (ref 7–18)
CALCIUM SERPL-MCNC: 8.5 MG/DL (ref 8.5–10.1)
CHLORIDE SERPL-SCNC: 103 MMOL/L (ref 98–107)
CO2 SERPL-SCNC: 23 MMOL/L (ref 21–32)
COLOR UR: YELLOW
CREAT SERPL-MCNC: 1.2 MG/DL (ref 0.6–1)
ERYTHROCYTE [DISTWIDTH] IN BLOOD BY AUTOMATED COUNT: 15.5 % (ref 10.5–14.5)
GLUCOSE SERPL-MCNC: 237 MG/DL (ref 74–106)
HCT VFR BLD CALC: 39.5 % (ref 37–47)
HGB BLD-MCNC: 12.9 GM/DL (ref 12–15)
HYALINE CASTS #/AREA URNS LPF: (no result) /LPF
KETONES UR STRIP-MCNC: NEGATIVE MG/DL
LIPASE: 58 U/L (ref 73–393)
MCH RBC QN AUTO: 28 PG (ref 26–34)
MCHC RBC AUTO-ENTMCNC: 32.8 G/DL (ref 28–37)
MCV RBC: 85.4 FL (ref 80–100)
MUCUS: (no result) STRN/LPF
PLATELET # BLD: 308 THOU/UL (ref 150–400)
POTASSIUM SERPL-SCNC: 3.8 MMOL/L (ref 3.5–5.1)
PROT SERPL-MCNC: 5.9 G/DL (ref 6.4–8.2)
RBC # BLD AUTO: 4.62 MIL/UL (ref 4.2–5)
RBC # UR STRIP: (no result) /UL
RBC #/AREA URNS HPF: (no result) /HPF (ref 0–2)
SODIUM SERPL-SCNC: 136 MMOL/L (ref 136–145)
SP GR UR STRIP: >= 1.03 (ref 1–1.03)
SQUAMOUS: (no result) /LPF (ref 0–3)
TROPONIN I SERPL-MCNC: <0.06 NG/ML (ref ?–0.06)
URINE CLARITY: (no result)
URINE GLUCOSE-RANDOM*: (no result)
URINE LEUKOCYTES-REFLEX: NEGATIVE
URINE NITRITE-REFLEX: NEGATIVE
URINE PROTEIN (DIPSTICK): (no result)
UROBILINOGEN UR STRIP-ACNC: 0.2 E.U./DL (ref 0.2–1)
WBC # BLD AUTO: 10.8 THOU/UL (ref 4–11)

## 2020-07-01 NOTE — EKG
[facility]
[address]
[place]   [address]                     ELECTROCARDIOGRAM REPORT      
_______________________________________________________________________________
 
Name:       EDSON BELL JOISE                [id number]                       Account #:      [id number]  
Admission:  20    Attend Phys:                          
Discharge:  20    Date of Birth:  [birth year]  
                                                          Report #: [id number]
                                                                    [id number]
_______________________________________________________________________________
THIS REPORT FOR:  
 
cc:  VICENTE - [place] family physician/PCP 
     VICENTE - No family physician/PCP 
     Joseph Ruiz MD                                             ~
THIS REPORT FOR:   //name//                          
 
                         [facility] ED
                                       
Test Date:    2020               Test Time:    20:29:24
Pat Name:     EDSON BELL             Department:   
Patient ID:   [id number]            Room:          
Gender:       F                        Technician:   
:          1975               Requested By: Sugar Sin
Order Number: [id number] MD:     
                                 Measurements
Intervals                              Axis          
Rate:         61                       P:            46
MT:           160                      QRS:          -28
QRSD:         91                       T:            2
QT:           405                                    
QTc:          408                                    
                           Interpretive Statements
Sinus rhythm
LVH by voltage
Compared to ECG 2020 20:17:48
Left ventricular hypertrophy now present
Intraventricular conduction delay no longer present
Myocardial infarct finding no longer present
[email or web address]
 
 
 
 
 
 
 
 
 
 
 
 
 
 
                         
   By:                               
                   
D: 20                           _____________________________________
T: 20                           Epiphany Epiphany, MD           /EPI

## 2020-07-02 VITALS — SYSTOLIC BLOOD PRESSURE: 130 MMHG | DIASTOLIC BLOOD PRESSURE: 68 MMHG

## 2020-07-02 NOTE — EKG
[facility]
[address]
[place]   [address]                     ELECTROCARDIOGRAM REPORT      
_______________________________________________________________________________
 
Name:       EDSON BELL                Room #:                     DEP [facility]#:      [id number]                       Account #:      [id number]  
Admission:  20    Attend Phys:                          
Discharge:  20    Date of Birth:  [birth year]  
                                                          Report #: [id number]
                                                                    [id number]
_______________________________________________________________________________
THIS REPORT FOR:  
 
cc:  VICENTE - No family physician/PCP 
     VICENTE - No family physician/PCP 
     Néstor Pryor MD [facility]
THIS REPORT FOR:   //name//                          
 
                         [facility] ED
                                       
Test Date:    2020               Test Time:    20:17:48
Pat Name:     EDOSN BELL             Department:   
Patient ID:   [id number]            Room:          
Gender:       F                        Technician:   
:          1975               Requested By: Sugar Sin
Order Number: [id number] MD:   Néstor Pryor
                                 Measurements
Intervals                              Axis          
Rate:         91                       P:            44
ND:           124                      QRS:          43
QRSD:         135                      T:            6
QT:           380                                    
QTc:          468                                    
                           Interpretive Statements
Sinus rhythm
Nonspecific intraventricular conduction delay
Small inferior and lateral Q waves
Compared to ECG 2020 17:33:58
No significant change was found
Electronically Signed On 2020 8:59:04 CDT by Néstor Pryor
[email or web address]
 
 
 
 
 
 
 
 
 
 
 
 
 
 
  <ELECTRONICALLY SIGNED>
   By: Néstor Pryor MD, FAC   
  20     0859
D: 20                           _____________________________________
T: 20                           Néstor Pryor MD, [facility]     /EPI

## 2020-07-19 ENCOUNTER — HOSPITAL ENCOUNTER (INPATIENT)
Dept: HOSPITAL 35 - ER | Age: 45
LOS: 4 days | Discharge: HOME | DRG: 194 | End: 2020-07-23
Attending: HOSPITALIST | Admitting: HOSPITALIST
Payer: COMMERCIAL

## 2020-07-19 VITALS — WEIGHT: 293 LBS | HEIGHT: 62.99 IN | BODY MASS INDEX: 51.91 KG/M2

## 2020-07-19 VITALS — DIASTOLIC BLOOD PRESSURE: 63 MMHG | SYSTOLIC BLOOD PRESSURE: 115 MMHG

## 2020-07-19 VITALS — SYSTOLIC BLOOD PRESSURE: 150 MMHG | DIASTOLIC BLOOD PRESSURE: 66 MMHG

## 2020-07-19 VITALS — DIASTOLIC BLOOD PRESSURE: 70 MMHG | SYSTOLIC BLOOD PRESSURE: 158 MMHG

## 2020-07-19 VITALS — SYSTOLIC BLOOD PRESSURE: 1468 MMHG | DIASTOLIC BLOOD PRESSURE: 74 MMHG

## 2020-07-19 VITALS — DIASTOLIC BLOOD PRESSURE: 87 MMHG | SYSTOLIC BLOOD PRESSURE: 152 MMHG

## 2020-07-19 DIAGNOSIS — Z88.1: ICD-10-CM

## 2020-07-19 DIAGNOSIS — Z82.49: ICD-10-CM

## 2020-07-19 DIAGNOSIS — I89.0: ICD-10-CM

## 2020-07-19 DIAGNOSIS — Z20.828: ICD-10-CM

## 2020-07-19 DIAGNOSIS — E78.5: ICD-10-CM

## 2020-07-19 DIAGNOSIS — Z90.49: ICD-10-CM

## 2020-07-19 DIAGNOSIS — Z86.718: ICD-10-CM

## 2020-07-19 DIAGNOSIS — E87.1: ICD-10-CM

## 2020-07-19 DIAGNOSIS — E11.9: ICD-10-CM

## 2020-07-19 DIAGNOSIS — B37.9: ICD-10-CM

## 2020-07-19 DIAGNOSIS — I10: ICD-10-CM

## 2020-07-19 DIAGNOSIS — G47.33: ICD-10-CM

## 2020-07-19 DIAGNOSIS — Z86.73: ICD-10-CM

## 2020-07-19 DIAGNOSIS — Z88.0: ICD-10-CM

## 2020-07-19 DIAGNOSIS — E66.01: ICD-10-CM

## 2020-07-19 DIAGNOSIS — Z88.6: ICD-10-CM

## 2020-07-19 DIAGNOSIS — J18.9: Primary | ICD-10-CM

## 2020-07-19 DIAGNOSIS — Z87.891: ICD-10-CM

## 2020-07-19 DIAGNOSIS — Z86.711: ICD-10-CM

## 2020-07-19 DIAGNOSIS — F41.9: ICD-10-CM

## 2020-07-19 DIAGNOSIS — F32.9: ICD-10-CM

## 2020-07-19 DIAGNOSIS — J44.0: ICD-10-CM

## 2020-07-19 DIAGNOSIS — K21.9: ICD-10-CM

## 2020-07-19 DIAGNOSIS — Z80.1: ICD-10-CM

## 2020-07-19 LAB
ALBUMIN SERPL-MCNC: 2.3 G/DL (ref 3.4–5)
ALT SERPL-CCNC: 12 U/L (ref 30–65)
ANION GAP SERPL CALC-SCNC: 9 MMOL/L (ref 7–16)
AST SERPL-CCNC: 15 U/L (ref 15–37)
BACTERIA-REFLEX: (no result) /HPF
BASOPHILS NFR BLD AUTO: 1.3 % (ref 0–2)
BE(VIVO): -3.5 MMOL/L
BILIRUB SERPL-MCNC: 0.2 MG/DL (ref 0.2–1)
BILIRUB UR-MCNC: NEGATIVE MG/DL
BUN SERPL-MCNC: 9 MG/DL (ref 7–18)
CALCIUM SERPL-MCNC: 8.9 MG/DL (ref 8.5–10.1)
CHLORIDE SERPL-SCNC: 102 MMOL/L (ref 98–107)
CO2 SERPL-SCNC: 24 MMOL/L (ref 21–32)
COLOR UR: YELLOW
CREAT SERPL-MCNC: 1.1 MG/DL (ref 0.6–1)
EOSINOPHIL NFR BLD: 1 % (ref 0–3)
ERYTHROCYTE [DISTWIDTH] IN BLOOD BY AUTOMATED COUNT: 16.5 % (ref 10.5–14.5)
GLUCOSE SERPL-MCNC: 320 MG/DL (ref 74–106)
GRANULOCYTES NFR BLD MANUAL: 68.3 % (ref 36–66)
HCO3 BLD-SCNC: 20.7 MMOL/L (ref 22–26)
HCT VFR BLD CALC: 40.1 % (ref 37–47)
HGB BLD-MCNC: 13.5 GM/DL (ref 12–15)
KETONES UR STRIP-MCNC: NEGATIVE MG/DL
LYMPHOCYTES NFR BLD AUTO: 24.7 % (ref 24–44)
MCH RBC QN AUTO: 28.3 PG (ref 26–34)
MCHC RBC AUTO-ENTMCNC: 33.6 G/DL (ref 28–37)
MCV RBC: 84.3 FL (ref 80–100)
MONOCYTES NFR BLD: 4.7 % (ref 1–8)
NEUTROPHILS # BLD: 3.9 THOU/UL (ref 1.4–8.2)
PCO2 BLD: 35 MMHG (ref 35–45)
PLATELET # BLD: 287 THOU/UL (ref 150–400)
PO2 BLD: 90.2 MMHG (ref 80–100)
POTASSIUM SERPL-SCNC: 4.4 MMOL/L (ref 3.5–5.1)
PROT SERPL-MCNC: 5.9 G/DL (ref 6.4–8.2)
RBC # BLD AUTO: 4.76 MIL/UL (ref 4.2–5)
RBC # UR STRIP: (no result) /UL
SODIUM SERPL-SCNC: 135 MMOL/L (ref 136–145)
SP GR UR STRIP: 1.02 (ref 1–1.03)
SQUAMOUS: (no result) /LPF (ref 0–3)
URINE CLARITY: CLEAR
URINE GLUCOSE-RANDOM*: (no result)
URINE LEUKOCYTES-REFLEX: NEGATIVE
URINE NITRITE-REFLEX: NEGATIVE
URINE PROTEIN (DIPSTICK): (no result)
UROBILINOGEN UR STRIP-ACNC: 0.2 E.U./DL (ref 0.2–1)
WBC # BLD AUTO: 5.7 THOU/UL (ref 4–11)

## 2020-07-19 PROCEDURE — 10047: CPT

## 2020-07-19 PROCEDURE — 10879: CPT

## 2020-07-19 NOTE — NUR
ATTEMPTED TO GIVE REPORT TO SONIA SHETH ON 3W, SHE ADVISED ME TO CB IN 15 
MINUTES TO GIVE REPORT TO NIGHT SHIFT

## 2020-07-20 VITALS — DIASTOLIC BLOOD PRESSURE: 81 MMHG | SYSTOLIC BLOOD PRESSURE: 153 MMHG

## 2020-07-20 VITALS — DIASTOLIC BLOOD PRESSURE: 91 MMHG | SYSTOLIC BLOOD PRESSURE: 148 MMHG

## 2020-07-20 VITALS — DIASTOLIC BLOOD PRESSURE: 81 MMHG | SYSTOLIC BLOOD PRESSURE: 131 MMHG

## 2020-07-20 VITALS — SYSTOLIC BLOOD PRESSURE: 152 MMHG | DIASTOLIC BLOOD PRESSURE: 80 MMHG

## 2020-07-20 VITALS — DIASTOLIC BLOOD PRESSURE: 80 MMHG | SYSTOLIC BLOOD PRESSURE: 136 MMHG

## 2020-07-20 VITALS — SYSTOLIC BLOOD PRESSURE: 143 MMHG | DIASTOLIC BLOOD PRESSURE: 82 MMHG

## 2020-07-20 LAB
ALBUMIN SERPL-MCNC: 2.5 G/DL (ref 3.4–5)
ALT SERPL-CCNC: 14 U/L (ref 30–65)
ANION GAP SERPL CALC-SCNC: 9 MMOL/L (ref 7–16)
AST SERPL-CCNC: 14 U/L (ref 15–37)
BASOPHILS NFR BLD AUTO: 0.3 % (ref 0–2)
BILIRUB SERPL-MCNC: 0.3 MG/DL (ref 0.2–1)
BUN SERPL-MCNC: 17 MG/DL (ref 7–18)
CALCIUM SERPL-MCNC: 8.5 MG/DL (ref 8.5–10.1)
CHLORIDE SERPL-SCNC: 95 MMOL/L (ref 98–107)
CO2 SERPL-SCNC: 23 MMOL/L (ref 21–32)
CREAT SERPL-MCNC: 1.3 MG/DL (ref 0.6–1)
EOSINOPHIL NFR BLD: 0 % (ref 0–3)
ERYTHROCYTE [DISTWIDTH] IN BLOOD BY AUTOMATED COUNT: 16.1 % (ref 10.5–14.5)
GLUCOSE SERPL-MCNC: 516 MG/DL (ref 74–106)
GRANULOCYTES NFR BLD MANUAL: 89.6 % (ref 36–66)
HCT VFR BLD CALC: 41.4 % (ref 37–47)
HGB BLD-MCNC: 13.7 GM/DL (ref 12–15)
LYMPHOCYTES NFR BLD AUTO: 9.4 % (ref 24–44)
MAGNESIUM SERPL-MCNC: 2 MG/DL (ref 1.8–2.4)
MCH RBC QN AUTO: 28.1 PG (ref 26–34)
MCHC RBC AUTO-ENTMCNC: 33.1 G/DL (ref 28–37)
MCV RBC: 85.1 FL (ref 80–100)
MONOCYTES NFR BLD: 0.7 % (ref 1–8)
NEUTROPHILS # BLD: 7.2 THOU/UL (ref 1.4–8.2)
PHOSPHATE SERPL-MCNC: 3.5 MG/DL (ref 2.5–4.9)
PLATELET # BLD: 304 THOU/UL (ref 150–400)
POTASSIUM SERPL-SCNC: 4.7 MMOL/L (ref 3.5–5.1)
PROT SERPL-MCNC: 6.3 G/DL (ref 6.4–8.2)
RBC # BLD AUTO: 4.87 MIL/UL (ref 4.2–5)
SODIUM SERPL-SCNC: 127 MMOL/L (ref 136–145)
WBC # BLD AUTO: 8 THOU/UL (ref 4–11)

## 2020-07-20 NOTE — EKG
[facility]
[address]
[place]   [address]                     ELECTROCARDIOGRAM REPORT      
_______________________________________________________________________________
 
Name:       EDSON BELL                Room #:         [facility]       ADM IN  
M.R.#:      [id number]                       Account #:      [id number]  
Admission:  20    Attend Phys:    Sera Alexis MD  
Discharge:              Date of Birth:  [birth year]  
                                                          Report #: [id number]
                                                                    [id number]
_______________________________________________________________________________
THIS REPORT FOR:  
 
cc:  VICENTE - [place] family physician/PCP 
     VICENTE - [place] family physician/PCP 
     Néstor Pryor MD [facility]
THIS REPORT FOR:   //name//                          
 
                         [facility] ED
                                       
Test Date:    2020               Test Time:    15:27:31
Pat Name:     EDSON BELL             Department:   
Patient ID:   [id number]            Room:         [facility]
Gender:       F                        Technician:   mariana
:          1975               Requested By: Madhu Garza
Order Number: [id number] MD:   Néstor Pryor
                                 Measurements
Intervals                              Axis          
Rate:         85                       P:            26
MT:           124                      QRS:          21
QRSD:         134                      T:            14
QT:           395                                    
QTc:          470                                    
                           Interpretive Statements
Sinus rhythm
Nonspecific intraventricular conduction delay
Inferior infarct, age indeterminate
Compared to ECG 07/15/2020 17:44:55
No significant changes
Electronically Signed On 2020 8:25:17 CDT by Néstor Pryor
[email or web address]
 
 
 
 
 
 
 
 
 
 
 
 
 
 
  <ELECTRONICALLY SIGNED>
   By: Néstor Pryor MD, [facility]   
  20     0825
D: 20 1527                           _____________________________________
T: 20 1527                           Néstor Pryor MD, [facility]     /EPI

## 2020-07-20 NOTE — NUR
PT IS A&OX3, PT'S VS ARE STABLE, RN HAS CALLED DR TO REPORT PT'S HIGH BS,PT
IS CONTINUING IV ABX AND BS MANAGEMENT,

## 2020-07-20 NOTE — NUR
PATIENT NEW ADMIT YESTERDAY AROUND 2000.  ALERT AND ORIENTED.  VITAL SIGNS
STABLE.  PT C/O LEFT ABDOMINAL PAIN RATING IT 8/10.  NP NOTIFIED FOR PAIN
MEDS.  PT ALSO NOTED TO BE POLYDIPSIA, NOC , INSULIN PER SLIDING SCALE.
PT ENCOURAGED TO CONTROL HER INTAKE.
ADMISSION ASSESSMENT COMPLETE AS DOCUMENTED.  PT ORIENTED TO ROOM AND CALL
LIGHT SYSTEM. VERBAL CONSENT OBTAINED FOR PAPERWORK. WILL CONTINUE TO MONITOR
AND FOLLOW POC. PT CURRENTLY RESTING. DENIES NAUSEA OR VOMITING, MINIMAL CHEST
DISCOMFORT. WILL CONTINUE TO MONITOR

## 2020-07-20 NOTE — NUR
PT'S FIRST COVID WAS NEGATIVE FROM 7/19/20 TEST, PT HAS SECOND COVID TEST AT
7/20/1600PM PER ORDER, BECAUSE PT HAS ABNORMAL CHEST CT SCAN RESULT,

## 2020-07-21 VITALS — SYSTOLIC BLOOD PRESSURE: 138 MMHG | DIASTOLIC BLOOD PRESSURE: 85 MMHG

## 2020-07-21 VITALS — DIASTOLIC BLOOD PRESSURE: 92 MMHG | SYSTOLIC BLOOD PRESSURE: 164 MMHG

## 2020-07-21 VITALS — SYSTOLIC BLOOD PRESSURE: 150 MMHG | DIASTOLIC BLOOD PRESSURE: 82 MMHG

## 2020-07-21 VITALS — DIASTOLIC BLOOD PRESSURE: 80 MMHG | SYSTOLIC BLOOD PRESSURE: 139 MMHG

## 2020-07-21 LAB
ANION GAP SERPL CALC-SCNC: 7 MMOL/L (ref 7–16)
BUN SERPL-MCNC: 21 MG/DL (ref 7–18)
CALCIUM SERPL-MCNC: 8.2 MG/DL (ref 8.5–10.1)
CHLORIDE SERPL-SCNC: 101 MMOL/L (ref 98–107)
CO2 SERPL-SCNC: 25 MMOL/L (ref 21–32)
CREAT SERPL-MCNC: 1.1 MG/DL (ref 0.6–1)
GLUCOSE SERPL-MCNC: 317 MG/DL (ref 74–106)
MAGNESIUM SERPL-MCNC: 2.2 MG/DL (ref 1.8–2.4)
POTASSIUM SERPL-SCNC: 4.6 MMOL/L (ref 3.5–5.1)
SODIUM SERPL-SCNC: 133 MMOL/L (ref 136–145)

## 2020-07-21 NOTE — NUR
PT IS A&OX3, PT IS CONTINUING IV ABX, AND BS MANAGEMENT , PT'S BS HAS
IMPROVED, PT'S VS ARE STABLE, PT'S COVID TEST RESULT IS PENDING.

## 2020-07-21 NOTE — NUR
ASSUMED CARE FROM DAY , PT UP IN CHAIR PT REQUESTING SNACK AND SANDWICHES
EVERY 2-3 HOURS, AND SODA ,LEGS SWOLLEN 2+ EDEMA NOTED. CARDIAC MONITOR SHOWS
NSR , DISCUSS PLAN OF CARE VEBALIZED UNDERSTANDING. DENIES SOB , BUT C/O UPPER
ABD [PAIN , WANTED IV PAIN MEDICATION WITH BOX LUNCH. PT AWAKE THROUGHOUT THE
NIGHT, WILL CONTINUE WITH PT CARE OF PLAN.

## 2020-07-21 NOTE — NUR
Nutrition: Pt admit with PNA, initial COVID negative. BMI 58. Dr Rodarte
request RD to visit with pt due to uncontrolled DM and frequently asking for
more food often q 2-3 hrs per nsg notes. 100% intake of meals. Weights
earlier this year > 400#. Current 330#. Possible significant loss. 2+
edema in legs. Need to obtain further wt hx. Attempted to phone pt due to
enhanced precautions room. Pt did not answer on 2 attempts. Note per past RD
notes pt with likely noncompliance hx. Educations have been offered in
the past as well as information related to alternative menu/carb
contents of menu items. Latest A1C pending. Last month A1C 10.3. -500
On both SSI and glargine. Endocrinology managing. RD will re-attempt phone
interview within 1-2 days. Place as low nutrition risk.

## 2020-07-21 NOTE — NUR
PT HAD 17 BEAT V-TACH PT WAS SLEEPING , /85 HR 83 , NP MELONIE NOTIFIED
CALL LAB TO DRAWN AM LAB NOW, CHEMISTRY.

## 2020-07-21 NOTE — NUR
INITIAL ASSESSMENT:
SW reviewed chart and spoke with nursing. Pt was admitted from home due to
pneumonia. Pt placed in Enhanced Isolation to r/o COVID-19. Pt's first test
was negative. Second test is pending. SW spoke with pt via phone. Introduced
role of SW. Pt appears to be alert/orientated. Pt reports she lives at home
with her . Prior to admission, pt was independent with ADLs. Pt does
have a walker. 2 steps to enter their home and no steps inside. Pt has a home
CPAP machine through [facility]. Pt's home O2 is through [facility]. Pt
states she used O2 PRN. No hx of HH services or post-acute placement. Pt's PCP
is Dr. Lida Torres. Pt's plan is to discharge home when medically stable.
SW is following to assist as needed with discharge planning.

## 2020-07-22 VITALS — DIASTOLIC BLOOD PRESSURE: 69 MMHG | SYSTOLIC BLOOD PRESSURE: 142 MMHG

## 2020-07-22 VITALS — SYSTOLIC BLOOD PRESSURE: 150 MMHG | DIASTOLIC BLOOD PRESSURE: 88 MMHG

## 2020-07-22 VITALS — DIASTOLIC BLOOD PRESSURE: 96 MMHG | SYSTOLIC BLOOD PRESSURE: 151 MMHG

## 2020-07-22 VITALS — SYSTOLIC BLOOD PRESSURE: 169 MMHG | DIASTOLIC BLOOD PRESSURE: 96 MMHG

## 2020-07-22 VITALS — DIASTOLIC BLOOD PRESSURE: 59 MMHG | SYSTOLIC BLOOD PRESSURE: 116 MMHG

## 2020-07-22 VITALS — SYSTOLIC BLOOD PRESSURE: 147 MMHG | DIASTOLIC BLOOD PRESSURE: 77 MMHG

## 2020-07-22 LAB
ANION GAP SERPL CALC-SCNC: 5 MMOL/L (ref 7–16)
BUN SERPL-MCNC: 18 MG/DL (ref 7–18)
CALCIUM SERPL-MCNC: 8.2 MG/DL (ref 8.5–10.1)
CHLORIDE SERPL-SCNC: 102 MMOL/L (ref 98–107)
CO2 SERPL-SCNC: 27 MMOL/L (ref 21–32)
CREAT SERPL-MCNC: 1.1 MG/DL (ref 0.6–1)
EST. AVERAGE GLUCOSE BLD GHB EST-MCNC: 255 MG/DL
GLUCOSE SERPL-MCNC: 159 MG/DL (ref 74–106)
GLYCOHEMOGLOBIN (HGB A1C): 10.5 % (ref 4.8–5.6)
MAGNESIUM SERPL-MCNC: 2 MG/DL (ref 1.8–2.4)
POTASSIUM SERPL-SCNC: 4.1 MMOL/L (ref 3.5–5.1)
SODIUM SERPL-SCNC: 134 MMOL/L (ref 136–145)

## 2020-07-22 NOTE — NUR
SW reviewed chart and spoke with nursing. Pt's COVID test is negative. Pt to
transfer off of 3W when a bed is available. Pt is afebrile. Remains on IV abx.
Plan is for pt to discharge home when medically stable. Pt has DME and home O2
in place. SW is following to assist as needed with discharge planning.

## 2020-07-22 NOTE — NUR
Assumed pt care this pm, transferred from , pt is up ad rosa and steady on
her gait. Stayed on her recliner, pain is managed with medications, diet is
well tolerated. POC followed with no signs or verbalizations of distress
noted.

## 2020-07-23 VITALS — DIASTOLIC BLOOD PRESSURE: 83 MMHG | SYSTOLIC BLOOD PRESSURE: 154 MMHG

## 2020-07-23 LAB
ANION GAP SERPL CALC-SCNC: 8 MMOL/L (ref 7–16)
BUN SERPL-MCNC: 18 MG/DL (ref 7–18)
CALCIUM SERPL-MCNC: 8.6 MG/DL (ref 8.5–10.1)
CHLORIDE SERPL-SCNC: 104 MMOL/L (ref 98–107)
CO2 SERPL-SCNC: 27 MMOL/L (ref 21–32)
CREAT SERPL-MCNC: 1.1 MG/DL (ref 0.6–1)
GLUCOSE SERPL-MCNC: 185 MG/DL (ref 74–106)
MAGNESIUM SERPL-MCNC: 1.8 MG/DL (ref 1.8–2.4)
POTASSIUM SERPL-SCNC: 4.6 MMOL/L (ref 3.5–5.1)
SODIUM SERPL-SCNC: 139 MMOL/L (ref 136–145)

## 2020-07-23 NOTE — NUR
PROGRESS
 
PT A/O X4 SKIN WARM DRY INTACT LUNGS DIMINISHED PT HAS AN INFREQUENT NON
PRODUCTIVE COUGH C/O RIGHT CHEST MUSCULAR PAIN FROM COUGHING THAT IS RELIEVED
WITH HYDROCODONE. PT UP WITH SBA VOIDING QS BS POSITIVE ACCUCHECKS AND SSI
CONTINUE PT IS A COMPULSIVE EATER AND REQUESTS LARGE AMOUNTS OF FOOD.

## 2020-07-23 NOTE — NUR
assumed pt care this am, VS stable diet and medications are well tolerated.
POC followed with no signs or verbalizations of distress noted. DC
intructions given to the pt by discharge nurse. IV removed pt has been picked
up by  at the ER, pt is now dc.

## 2020-07-23 NOTE — NUR
CARE TEAM INDICATED THAT PT IS MEDICALLY STABLE TO DC HOME THIS DAY. PT HAS
DME AND HOME O2. PT TO DC HOME TO SELF CARE. PT'S SPOUSE TO PROVIDE TRANSPORT
HOME THIS DAY VIA PERSONAL VEHICLE. NO OTHER CM INTERVENTION INDICATED. CASE
CLOSED.

## 2020-07-29 ENCOUNTER — HOSPITAL ENCOUNTER (EMERGENCY)
Dept: HOSPITAL 35 - ER | Age: 45
Discharge: HOME | End: 2020-07-29
Payer: COMMERCIAL

## 2020-07-29 VITALS — SYSTOLIC BLOOD PRESSURE: 115 MMHG | DIASTOLIC BLOOD PRESSURE: 47 MMHG

## 2020-07-29 VITALS — HEIGHT: 63 IN | WEIGHT: 293 LBS | BODY MASS INDEX: 51.91 KG/M2

## 2020-07-29 DIAGNOSIS — I10: ICD-10-CM

## 2020-07-29 DIAGNOSIS — Z79.4: ICD-10-CM

## 2020-07-29 DIAGNOSIS — Z90.49: ICD-10-CM

## 2020-07-29 DIAGNOSIS — R60.0: ICD-10-CM

## 2020-07-29 DIAGNOSIS — F32.9: ICD-10-CM

## 2020-07-29 DIAGNOSIS — F17.210: ICD-10-CM

## 2020-07-29 DIAGNOSIS — R06.02: Primary | ICD-10-CM

## 2020-07-29 DIAGNOSIS — Z88.5: ICD-10-CM

## 2020-07-29 DIAGNOSIS — Z86.73: ICD-10-CM

## 2020-07-29 DIAGNOSIS — K21.9: ICD-10-CM

## 2020-07-29 DIAGNOSIS — E78.5: ICD-10-CM

## 2020-07-29 DIAGNOSIS — Z88.4: ICD-10-CM

## 2020-07-29 DIAGNOSIS — J44.9: ICD-10-CM

## 2020-07-29 DIAGNOSIS — R07.9: ICD-10-CM

## 2020-07-29 DIAGNOSIS — Z79.899: ICD-10-CM

## 2020-07-29 DIAGNOSIS — Z88.6: ICD-10-CM

## 2020-07-29 DIAGNOSIS — I87.8: ICD-10-CM

## 2020-07-29 DIAGNOSIS — E11.9: ICD-10-CM

## 2020-07-29 DIAGNOSIS — Z88.0: ICD-10-CM

## 2020-07-29 DIAGNOSIS — F41.9: ICD-10-CM

## 2020-07-29 DIAGNOSIS — R11.0: ICD-10-CM

## 2020-07-29 DIAGNOSIS — E66.01: ICD-10-CM

## 2020-07-31 NOTE — EKG
[facility]
[address] [place]   [address]                     ELECTROCARDIOGRAM REPORT      
_______________________________________________________________________________
 
Name:       EDSON BELL                Room #:                     DEP [facility]#:      [id number]                       Account #:      [id number]  
Admission:  20    Attend Phys:                          
Discharge:  20    Date of Birth:  [birth year]  
                                                          Report #: [id number]
                                                                    [id number]
_______________________________________________________________________________
THIS REPORT FOR:  
 
cc:  VICENTE - No family physician/PCP 
     FAM - No family physician/PCP 
     Néstor Pryor MD [facility]
THIS REPORT FOR:   //name//                          
 
                         [facility] ED
                                       
Test Date:    2020               Test Time:    19:47:23
Pat Name:     EDSON BELL             Department:   
Patient ID:   [id number]            Room:          
Gender:       F                        Technician:   VIRI
:          1975               Requested By: Justin Beasley
Order Number: [id number] MD:   Néstor Pryor
                                 Measurements
Intervals                              Axis          
Rate:         104                      P:            41
KS:           130                      QRS:          4
QRSD:         124                      T:            -1
QT:           352                                    
QTc:          463                                    
                           Interpretive Statements
Sinus tachycardia
Nonspecific intraventricular conduction delay
Inferior infarct, old
Poor R wave progression
Compared to ECG 2020 15:27:31
No significant change was found
Electronically Signed On 2020 9:02:25 CDT by Néstor Pryor
[email or web address]
 
 
 
 
 
 
 
 
 
 
 
 
 
  <ELECTRONICALLY SIGNED>
   By: Néstor Pryor MD, [facility]   
  20     0902
D: 20                           _____________________________________
T: 20                           Néstor Pryor MD, [facility]     /EPI

## 2021-06-10 ENCOUNTER — HOSPITAL ENCOUNTER (INPATIENT)
Dept: HOSPITAL 35 - ER | Age: 46
LOS: 2 days | Discharge: HOME | DRG: 191 | End: 2021-06-12
Attending: HOSPITALIST | Admitting: HOSPITALIST
Payer: COMMERCIAL

## 2021-06-10 VITALS — SYSTOLIC BLOOD PRESSURE: 141 MMHG | DIASTOLIC BLOOD PRESSURE: 60 MMHG

## 2021-06-10 VITALS — SYSTOLIC BLOOD PRESSURE: 110 MMHG | DIASTOLIC BLOOD PRESSURE: 39 MMHG

## 2021-06-10 VITALS
SYSTOLIC BLOOD PRESSURE: 122 MMHG | HEIGHT: 62.99 IN | DIASTOLIC BLOOD PRESSURE: 79 MMHG | WEIGHT: 293 LBS | BODY MASS INDEX: 51.91 KG/M2

## 2021-06-10 VITALS — DIASTOLIC BLOOD PRESSURE: 48 MMHG | SYSTOLIC BLOOD PRESSURE: 109 MMHG

## 2021-06-10 DIAGNOSIS — E78.5: ICD-10-CM

## 2021-06-10 DIAGNOSIS — E66.01: ICD-10-CM

## 2021-06-10 DIAGNOSIS — Z88.5: ICD-10-CM

## 2021-06-10 DIAGNOSIS — F17.210: ICD-10-CM

## 2021-06-10 DIAGNOSIS — Z79.01: ICD-10-CM

## 2021-06-10 DIAGNOSIS — Z86.718: ICD-10-CM

## 2021-06-10 DIAGNOSIS — Z79.899: ICD-10-CM

## 2021-06-10 DIAGNOSIS — G47.33: ICD-10-CM

## 2021-06-10 DIAGNOSIS — Z90.49: ICD-10-CM

## 2021-06-10 DIAGNOSIS — Z88.8: ICD-10-CM

## 2021-06-10 DIAGNOSIS — Z86.711: ICD-10-CM

## 2021-06-10 DIAGNOSIS — K21.9: ICD-10-CM

## 2021-06-10 DIAGNOSIS — J44.1: Primary | ICD-10-CM

## 2021-06-10 DIAGNOSIS — F32.9: ICD-10-CM

## 2021-06-10 DIAGNOSIS — I50.810: ICD-10-CM

## 2021-06-10 DIAGNOSIS — E11.42: ICD-10-CM

## 2021-06-10 DIAGNOSIS — F41.9: ICD-10-CM

## 2021-06-10 DIAGNOSIS — I50.32: ICD-10-CM

## 2021-06-10 DIAGNOSIS — I11.0: ICD-10-CM

## 2021-06-10 DIAGNOSIS — Z79.4: ICD-10-CM

## 2021-06-10 DIAGNOSIS — Z86.73: ICD-10-CM

## 2021-06-10 DIAGNOSIS — B37.9: ICD-10-CM

## 2021-06-10 DIAGNOSIS — Z88.0: ICD-10-CM

## 2021-06-10 LAB
ANION GAP SERPL CALC-SCNC: 10 MMOL/L (ref 7–16)
BACTERIA-REFLEX: (no result) /HPF
BASOPHILS NFR BLD AUTO: 0.8 % (ref 0–2)
BILIRUB UR-MCNC: NEGATIVE MG/DL
BUN SERPL-MCNC: 17 MG/DL (ref 7–18)
CALCIUM SERPL-MCNC: 9.1 MG/DL (ref 8.5–10.1)
CHLORIDE SERPL-SCNC: 105 MMOL/L (ref 98–107)
CO2 SERPL-SCNC: 25 MMOL/L (ref 21–32)
COLOR UR: YELLOW
CREAT SERPL-MCNC: 1 MG/DL (ref 0.6–1)
EOSINOPHIL NFR BLD: 0.5 % (ref 0–3)
ERYTHROCYTE [DISTWIDTH] IN BLOOD BY AUTOMATED COUNT: 15.7 % (ref 10.5–14.5)
GLUCOSE SERPL-MCNC: 147 MG/DL (ref 74–106)
GRANULOCYTES NFR BLD MANUAL: 81.4 % (ref 36–66)
HCT VFR BLD CALC: 39.3 % (ref 37–47)
HGB BLD-MCNC: 13.5 GM/DL (ref 12–15)
KETONES UR STRIP-MCNC: NEGATIVE MG/DL
LYMPHOCYTES NFR BLD AUTO: 12.5 % (ref 24–44)
MCH RBC QN AUTO: 27.8 PG (ref 26–34)
MCHC RBC AUTO-ENTMCNC: 34.3 G/DL (ref 28–37)
MCV RBC: 80.9 FL (ref 80–100)
MONOCYTES NFR BLD: 4.8 % (ref 1–8)
NEUTROPHILS # BLD: 11.6 THOU/UL (ref 1.4–8.2)
PLATELET # BLD: 409 THOU/UL (ref 150–400)
POTASSIUM SERPL-SCNC: 3.9 MMOL/L (ref 3.5–5.1)
RBC # BLD AUTO: 4.86 MIL/UL (ref 4.2–5)
RBC # UR STRIP: (no result) /UL
RBC #/AREA URNS HPF: (no result) /HPF
SODIUM SERPL-SCNC: 140 MMOL/L (ref 136–145)
SP GR UR STRIP: >= 1.03 (ref 1–1.03)
SQUAMOUS: (no result) /LPF (ref 0–3)
TROPONIN I SERPL-MCNC: <0.06 NG/ML (ref ?–0.06)
URINE CLARITY: CLEAR
URINE GLUCOSE-RANDOM*: (no result)
URINE LEUKOCYTES-REFLEX: NEGATIVE
URINE NITRITE-REFLEX: NEGATIVE
URINE PROTEIN (DIPSTICK): (no result)
URINE WBC-REFLEX: (no result) /HPF (ref 0–5)
UROBILINOGEN UR STRIP-ACNC: 0.2 E.U./DL (ref 0.2–1)
WBC # BLD AUTO: 14.2 THOU/UL (ref 4–11)

## 2021-06-10 PROCEDURE — 10081 I&D PILONIDAL CYST COMP: CPT

## 2021-06-11 VITALS — SYSTOLIC BLOOD PRESSURE: 149 MMHG | DIASTOLIC BLOOD PRESSURE: 44 MMHG

## 2021-06-11 VITALS — DIASTOLIC BLOOD PRESSURE: 74 MMHG | SYSTOLIC BLOOD PRESSURE: 133 MMHG

## 2021-06-11 VITALS — SYSTOLIC BLOOD PRESSURE: 148 MMHG | DIASTOLIC BLOOD PRESSURE: 81 MMHG

## 2021-06-11 VITALS — DIASTOLIC BLOOD PRESSURE: 98 MMHG | SYSTOLIC BLOOD PRESSURE: 151 MMHG

## 2021-06-11 LAB
CHOLEST SERPL-MCNC: 152 MG/DL (ref ?–200)
HDLC SERPL-MCNC: 36 MG/DL (ref 40–?)
LDLC SERPL-MCNC: 83 MG/DL (ref ?–100)
TC:HDL: 4.2 RATIO
TRIGL SERPL-MCNC: 165 MG/DL (ref ?–150)
VLDLC SERPL CALC-MCNC: 33 MG/DL (ref ?–40)

## 2021-06-11 PROCEDURE — 5A09357 ASSISTANCE WITH RESPIRATORY VENTILATION, LESS THAN 24 CONSECUTIVE HOURS, CONTINUOUS POSITIVE AIRWAY PRESSURE: ICD-10-PCS | Performed by: HOSPITALIST

## 2021-06-11 NOTE — NUR
PT ADMITTED TO ROOM 209 AT 2200, PT IS AWAKE, ALERT AND ORIENTEDX4, ORIENTED
TO ROOM, ADMISSION ASSESSMENT AND HISTORY COMPLETED, PT RATES CP AT 8/10, NP
NOTIFIED, SR ON TELE, ORDERS RECEIVED AND IMPLEMENTED, ASSESSMENTS AS CHARTED,
PT SLEEPING, NO DISTRESS NOTED, WILL CONTINUE TO MONITOR AND FOLLOW POC

## 2021-06-11 NOTE — 2DMMODE
[facility]
[address] [place]   [address]                   2 D/M-MODE ECHOCARDIOGRAM     
_______________________________________________________________________________
 
Name:       EDSON BELL                Room #:         209-P       ADM IN  
M.R.#:      [id number]                       Account #:      [id number]  
Admission:  06/10/21    Attend Phys:    Nicho Rodarte MD 
Discharge:              Date of Birth:  [birth year]  
                                                          Report #: [id number]
                                                                    [id number]
_______________________________________________________________________________
THIS REPORT FOR:  
 
cc:  FAM - Family physician unknown
     FAM - Family physician unknown
     Thiago Kovacs MD                                                   
                                                                       ~
 
--------------- APPROVED REPORT --------------
 
 
Study performed:  2021 08:40:42
 
EXAM: Comprehensive 2D, Doppler, and color-flow 
Echocardiogram 
Patient Location: Bedside   
Room #:  209     Status:  routine
 
      BSA:         2.42
HR: 72 bpm BP:          133/74 mmHg 
Rhythm: NSR     
 
Other Information 
Study Quality: Poor
Technically limited study due to  super morbid 
obesity.
 
Indications
Chest Pain
Hx: PE, HTN, HLP, DM, COPD.
 
Echo Enhancing Agent
Indication: Endocardial border delineation
Agent(s) / Amount(s) Used: Optison 6 cc
 
2D Dimensions
IVSd:  13.00 (7-11mm)  LVOT Diam:  21.12 (18-24mm) 
LVDd:  45.29 mm   
PWd:  13.58 (7-11mm)  Ascending Ao:  31.82 (22-36mm)
LVDs:  33.23 (25-40mm)  
Left Atrium:  42.18 (27-40mm) 
Aortic Root:  35.93 mm  
 
Volumes
Left Atrial Volume (Systole) 
Single Plane 4CH:  50.72 mL Single Plane 2CH:  85.17 mL
    LA ESV Index:  29.00 mL/m2
 
 
[facility]
[address]
[place]  [address]
Phone:  [phone]                    2 D/M-MODE ECHOCARDIOGRAM     
_______________________________________________________________________________
 
Name:            ARABELLAALEEDSON L                Room #:        209-P       [facility] [place]
.R.#:           [id number]          Account #:     [id number]  
Admission:       06/10/21         Attend Phys:   Nicho Rodarte,
Discharge:                  Date of Birth: [birth year]  
                         Report #:      [id number]
        [id number]
_______________________________________________________________________________
 
Aortic Valve
AoV Peak Domenic.:  1.49 m/s 
AO Peak Gr.:  8.86 mmHg  LVOT Max P.65 mmHg
    LVOT Max V:  1.08 m/s
LESIA Vmax: 2.54 cm2  
 
Mitral Valve
    E/A Ratio:  0.8
    MV Decel. Time:  248.57 ms
MV E Max Domenic.:  0.79 m/s 
MV A Domenic.:  1.01 m/s  
MV PHT:  72.09 ms  
IVRT:  93.43 ms   
 
Pulmonary Valve
PV Peak Domenic.:  0.94 m/s PV Peak Gr.:  3.53 mmHg
 
Tricuspid Valve
TR Peak Domenic.:  3.24 m/s  RAP Estimate:  5.00 mmHg
TR Peak Gr.:  42.08 mmHg 
    PA Pressure:  47.00 mmHg
 
Left Ventricle
The left ventricle is normal size. There is normal LV segmental wall 
motion. Mild concentric left ventricular hypertrophy. Left 
ventricular systolic function is normal. LVEF is 60%. Mild diastolic 
dysfunction is present (impaired relaxation pattern).
 
Right Ventricle
Right ventricle is not well visualized.
 
Atria
The left atrium size is normal. The right atrium size is 
normal.
 
Aortic Valve
The aortic valve is normal in structure. No aortic regurgitation is 
present. There is no aortic valvular stenosis.
 
Mitral Valve
The mitral valve is normal in structure. Mild to moderate mitral 
regurgitation.
 
Tricuspid Valve
The tricuspid valve is normal in structure. Mild tricuspid 
 
 
[facility]
[address]
[place]  [address]
Phone:  [phone]                    2 D/M-MODE ECHOCARDIOGRAM     
_______________________________________________________________________________
 
Name:            EDSON BELL JOSIE                Room #:        209-P       [facility] [place]
M.R.#:           [id number]          Account #:     [id number]  
Admission:       06/10/21         Attend Phys:   Nicho Rodarte,
Discharge:                  Date of Birth: [birth year]  
                         Report #:      [id number]
        [id number]
_______________________________________________________________________________
regurgitation. Estimated PAP is 42mmHg.
 
Pulmonic Valve
Pulmonic valve is not well visualized. There is no pulmonic valvular 
regurgitation.
 
Great Vessels
The aortic root is normal in size. The ascending aorta is normal in 
size. IVC is normal in size and collapses >50% with 
inspiration.
 
Pericardium
There is no pericardial effusion.
 
<Conclusion>
The left ventricle is normal size.
Mild concentric left ventricular hypertrophy.
Left ventricular systolic function is normal.
Mild diastolic dysfunction is present (impaired relaxation 
pattern).
Right ventricle is not well visualized.
The left atrium size is normal.
The aortic valve is normal in structure.
Mild to moderate mitral regurgitation.
Mild tricuspid regurgitation. Estimated PAP is 42mmHg.
 
 
 
 
 
 
 
 
 
 
 
 
 
 
 
 
 
 
 
  <ELECTRONICALLY SIGNED>
   By: Thiago Kovacs MD               
  21
D: 21                           _____________________________________
T: 21                           Thiago Kovacs MD                 /INF

## 2021-06-11 NOTE — NUR
met with patient who admits with chest pain. Patient resides at home with
spouse. PTA independent with adls and self care. Patient has CPAP at home.
Patient does not use oxygen at home. She is currently weaned off oxygen.
Patient reports she has PCP. [place] for
prescriptions. Patient anticipates no needs from casemgt at discharge.
Following and avail if needs arise.

## 2021-06-11 NOTE — NUR
ASSUMED CARE SHIFT CHANGE. VSS. MEDS GIVEN PER MAR. PT UP WITH WALKER MY
WELL. DENIES CHEST PAIN. ECHO NORM. PLAN FOR STRESS TEST OUTPT ONCE DC. LASIX
GIVEN DIURESING APPROPRIATELY. DENIES NEEDS CURRENTLY. CONTINUING POC.
WILLPASS ON REPORT TO NOC RN.

## 2021-06-11 NOTE — EKG
[address] [employer]
[place]  [address]
Phone:  [phone]                    ELECTROCARDIOGRAM REPORT      
_______________________________________________________________________________
 
Name:       EDSON BELL                Room #:         209-P       ADM IN  
M.R.#:      [id number]     Account #:      [id number]  
Admission:  06/10/21    Attend Phys:    Lorri Burks
Discharge:              Date of Birth:  [birth year]  
                                                          Report #: [id number]
   [id number]
_______________________________________________________________________________
                         [facility] ED
                                       
Test Date:    2021-06-10               Test Time:    17:34:16
Pat Name:     EDSON BELL             Department:   
Patient ID:   [id number]            Room:         209
Gender:       F                        Technician:   DOMINGO
:          1975               Requested By: Job Berumen
Order Number: [id number] MD:   Mateo Briceno
                                 Measurements
Intervals                              Axis          
Rate:         100                      P:            40
OH:           116                      QRS:          -1
QRSD:         127                      T:            3
QT:           382                                    
QTc:          493                                    
                           Interpretive Statements
Sinus tachycardia
Nonspecific intraventricular conduction delay
Consider inferior infarct
Compared to ECG 2020 19:47:23
Poor R-wave progression no longer present
Myocardial infarct finding still present
Electronically Signed On 2021 7:05:38 CDT by Mateo Briceno
[email or web address]
 
 
 
 
 
 
 
 
 
 
 
 
 
 
 
 
 
 
 
  <ELECTRONICALLY SIGNED>
   By: Mateo Briceno MD, [facility]    
  21     07
D: 06/10/21 1734                           _____________________________________
T: 06/10/21 1734                           Mateo Briceno MD, [facility]      /EPI

## 2021-06-12 VITALS — SYSTOLIC BLOOD PRESSURE: 139 MMHG | DIASTOLIC BLOOD PRESSURE: 73 MMHG

## 2021-06-12 VITALS — DIASTOLIC BLOOD PRESSURE: 76 MMHG | SYSTOLIC BLOOD PRESSURE: 138 MMHG

## 2021-06-12 VITALS — SYSTOLIC BLOOD PRESSURE: 126 MMHG | DIASTOLIC BLOOD PRESSURE: 52 MMHG

## 2021-06-12 LAB
ANION GAP SERPL CALC-SCNC: 9 MMOL/L (ref 7–16)
BUN SERPL-MCNC: 20 MG/DL (ref 7–18)
CALCIUM SERPL-MCNC: 9.1 MG/DL (ref 8.5–10.1)
CHLORIDE SERPL-SCNC: 103 MMOL/L (ref 98–107)
CO2 SERPL-SCNC: 26 MMOL/L (ref 21–32)
CREAT SERPL-MCNC: 1.2 MG/DL (ref 0.6–1)
ERYTHROCYTE [DISTWIDTH] IN BLOOD BY AUTOMATED COUNT: 16.3 % (ref 10.5–14.5)
EST. AVERAGE GLUCOSE BLD GHB EST-MCNC: 249 MG/DL
GLUCOSE SERPL-MCNC: 206 MG/DL (ref 74–106)
GLYCOHEMOGLOBIN (HGB A1C): 10.3 % (ref 4.8–5.6)
HCT VFR BLD CALC: 40.6 % (ref 37–47)
HGB BLD-MCNC: 13.2 GM/DL (ref 12–15)
MAGNESIUM SERPL-MCNC: 1.8 MG/DL (ref 1.8–2.4)
MCH RBC QN AUTO: 27 PG (ref 26–34)
MCHC RBC AUTO-ENTMCNC: 32.5 G/DL (ref 28–37)
MCV RBC: 83.2 FL (ref 80–100)
PLATELET # BLD: 332 THOU/UL (ref 150–400)
POTASSIUM SERPL-SCNC: 4.5 MMOL/L (ref 3.5–5.1)
RBC # BLD AUTO: 4.88 MIL/UL (ref 4.2–5)
SODIUM SERPL-SCNC: 138 MMOL/L (ref 136–145)
WBC # BLD AUTO: 11.4 THOU/UL (ref 4–11)

## 2021-06-12 NOTE — NUR
SLEPT PART OF NOC ON CPAP WITH 2L/O2. UP WITH STANDBY ASSIST AS NEEDED TO
BATHROOM WITH WALKER WITH SLOW STEADY GAIT. WORKING ON GOALS AND PLAN OF CARE
FOR NOC. CONTINUE TO ASSES CLOSELY.

## 2021-06-12 NOTE — NUR
RECEIVED THE PATIENT SITTING ON CHAIR, CONSCIOUS AND ORIENTED.ON ROOM AIR
BREATHING SPONTANEOUSLY.NO COMPLAINTS OF CHEST PAIN.NOT IN DISTRESS.VITALL
STABLE THROUGHOUT THE SHIFT.DICHARGE PACKET GIVEN TO THE PATIENT AND
EVERYTHING EXPLAINED.WAITING FOR HER RIDE,TO DISCHARGE PATIENT.

## 2021-07-28 ENCOUNTER — HOSPITAL ENCOUNTER (INPATIENT)
Dept: HOSPITAL 35 - ER | Age: 46
LOS: 4 days | Discharge: HOME | DRG: 291 | End: 2021-08-01
Attending: HOSPITALIST | Admitting: HOSPITALIST
Payer: COMMERCIAL

## 2021-07-28 VITALS — SYSTOLIC BLOOD PRESSURE: 127 MMHG | DIASTOLIC BLOOD PRESSURE: 62 MMHG

## 2021-07-28 VITALS — HEIGHT: 62.99 IN | BODY MASS INDEX: 51.91 KG/M2 | WEIGHT: 293 LBS

## 2021-07-28 DIAGNOSIS — Z86.718: ICD-10-CM

## 2021-07-28 DIAGNOSIS — Z79.4: ICD-10-CM

## 2021-07-28 DIAGNOSIS — J44.0: ICD-10-CM

## 2021-07-28 DIAGNOSIS — E11.22: ICD-10-CM

## 2021-07-28 DIAGNOSIS — Z86.73: ICD-10-CM

## 2021-07-28 DIAGNOSIS — Z20.822: ICD-10-CM

## 2021-07-28 DIAGNOSIS — J18.9: ICD-10-CM

## 2021-07-28 DIAGNOSIS — N17.9: ICD-10-CM

## 2021-07-28 DIAGNOSIS — I08.1: ICD-10-CM

## 2021-07-28 DIAGNOSIS — J44.9: ICD-10-CM

## 2021-07-28 DIAGNOSIS — E11.65: ICD-10-CM

## 2021-07-28 DIAGNOSIS — F41.9: ICD-10-CM

## 2021-07-28 DIAGNOSIS — F17.210: ICD-10-CM

## 2021-07-28 DIAGNOSIS — Z88.8: ICD-10-CM

## 2021-07-28 DIAGNOSIS — Z79.01: ICD-10-CM

## 2021-07-28 DIAGNOSIS — Z88.0: ICD-10-CM

## 2021-07-28 DIAGNOSIS — N18.30: ICD-10-CM

## 2021-07-28 DIAGNOSIS — G47.33: ICD-10-CM

## 2021-07-28 DIAGNOSIS — E28.2: ICD-10-CM

## 2021-07-28 DIAGNOSIS — I50.33: ICD-10-CM

## 2021-07-28 DIAGNOSIS — E66.01: ICD-10-CM

## 2021-07-28 DIAGNOSIS — Z88.5: ICD-10-CM

## 2021-07-28 DIAGNOSIS — Z79.899: ICD-10-CM

## 2021-07-28 DIAGNOSIS — Z90.49: ICD-10-CM

## 2021-07-28 DIAGNOSIS — F32.9: ICD-10-CM

## 2021-07-28 DIAGNOSIS — Z88.1: ICD-10-CM

## 2021-07-28 DIAGNOSIS — E78.5: ICD-10-CM

## 2021-07-28 DIAGNOSIS — J44.1: ICD-10-CM

## 2021-07-28 DIAGNOSIS — I13.0: Primary | ICD-10-CM

## 2021-07-28 LAB
ALBUMIN SERPL-MCNC: 2.9 G/DL (ref 3.4–5)
ALT SERPL-CCNC: 15 U/L (ref 14–59)
ANION GAP SERPL CALC-SCNC: 12 MMOL/L (ref 7–16)
AST SERPL-CCNC: 12 U/L (ref 15–37)
BASOPHILS NFR BLD AUTO: 0.9 % (ref 0–2)
BILIRUB SERPL-MCNC: 0.2 MG/DL (ref 0.2–1)
BUN SERPL-MCNC: 23 MG/DL (ref 7–18)
CALCIUM SERPL-MCNC: 9.3 MG/DL (ref 8.5–10.1)
CHLORIDE SERPL-SCNC: 100 MMOL/L (ref 98–107)
CO2 SERPL-SCNC: 23 MMOL/L (ref 21–32)
CREAT SERPL-MCNC: 1.7 MG/DL (ref 0.6–1)
EOSINOPHIL NFR BLD: 1.3 % (ref 0–3)
ERYTHROCYTE [DISTWIDTH] IN BLOOD BY AUTOMATED COUNT: 16.4 % (ref 10.5–14.5)
GLUCOSE SERPL-MCNC: 461 MG/DL (ref 74–106)
GRANULOCYTES NFR BLD MANUAL: 72.1 % (ref 36–66)
HCT VFR BLD CALC: 43.3 % (ref 37–47)
HGB BLD-MCNC: 14.2 GM/DL (ref 12–15)
LYMPHOCYTES NFR BLD AUTO: 20.8 % (ref 24–44)
MCH RBC QN AUTO: 27.5 PG (ref 26–34)
MCHC RBC AUTO-ENTMCNC: 32.7 G/DL (ref 28–37)
MCV RBC: 84.2 FL (ref 80–100)
MONOCYTES NFR BLD: 4.9 % (ref 1–8)
NEUTROPHILS # BLD: 9.8 THOU/UL (ref 1.4–8.2)
PLATELET # BLD: 417 THOU/UL (ref 150–400)
POTASSIUM SERPL-SCNC: 4.7 MMOL/L (ref 3.5–5.1)
PROT SERPL-MCNC: 7.1 G/DL (ref 6.4–8.2)
RBC # BLD AUTO: 5.14 MIL/UL (ref 4.2–5)
SODIUM SERPL-SCNC: 135 MMOL/L (ref 136–145)
TROPONIN I SERPL-MCNC: <0.06 NG/ML (ref ?–0.06)
WBC # BLD AUTO: 13.6 THOU/UL (ref 4–11)

## 2021-07-28 PROCEDURE — 10879: CPT

## 2021-07-29 VITALS — DIASTOLIC BLOOD PRESSURE: 61 MMHG | SYSTOLIC BLOOD PRESSURE: 134 MMHG

## 2021-07-29 VITALS — SYSTOLIC BLOOD PRESSURE: 116 MMHG | DIASTOLIC BLOOD PRESSURE: 71 MMHG

## 2021-07-29 VITALS — DIASTOLIC BLOOD PRESSURE: 76 MMHG | SYSTOLIC BLOOD PRESSURE: 104 MMHG

## 2021-07-29 VITALS — SYSTOLIC BLOOD PRESSURE: 131 MMHG | DIASTOLIC BLOOD PRESSURE: 76 MMHG

## 2021-07-29 VITALS — SYSTOLIC BLOOD PRESSURE: 127 MMHG | DIASTOLIC BLOOD PRESSURE: 62 MMHG

## 2021-07-29 VITALS — SYSTOLIC BLOOD PRESSURE: 113 MMHG | DIASTOLIC BLOOD PRESSURE: 53 MMHG

## 2021-07-29 LAB
ANION GAP SERPL CALC-SCNC: 8 MMOL/L (ref 7–16)
BUN SERPL-MCNC: 28 MG/DL (ref 7–18)
CALCIUM SERPL-MCNC: 8.4 MG/DL (ref 8.5–10.1)
CHLORIDE SERPL-SCNC: 102 MMOL/L (ref 98–107)
CO2 SERPL-SCNC: 25 MMOL/L (ref 21–32)
CREAT SERPL-MCNC: 1.9 MG/DL (ref 0.6–1)
ERYTHROCYTE [DISTWIDTH] IN BLOOD BY AUTOMATED COUNT: 16.9 % (ref 10.5–14.5)
GLUCOSE SERPL-MCNC: 352 MG/DL (ref 74–106)
HCT VFR BLD CALC: 40.1 % (ref 37–47)
HGB BLD-MCNC: 13.1 GM/DL (ref 12–15)
MCH RBC QN AUTO: 27.7 PG (ref 26–34)
MCHC RBC AUTO-ENTMCNC: 32.7 G/DL (ref 28–37)
MCV RBC: 84.8 FL (ref 80–100)
PLATELET # BLD: 343 THOU/UL (ref 150–400)
POTASSIUM SERPL-SCNC: 5.1 MMOL/L (ref 3.5–5.1)
RBC # BLD AUTO: 4.73 MIL/UL (ref 4.2–5)
SODIUM SERPL-SCNC: 135 MMOL/L (ref 136–145)
WBC # BLD AUTO: 13.6 THOU/UL (ref 4–11)

## 2021-07-29 NOTE — NUR
INITIAL ASSESSMENT:
SW reviewed chart and spoke with nursing. Pt was admitted from home due to
chest pain. Pt is on 3L of O2 and is on IV abx and IV steroids. Pt has
received the Moderna COVID vaccine.  SW spoke with pt via phone. Introduced
role of SW. Pt appears to be alert/orientated. Pt reports she lives at home
with her . Prior to admission, pt was independent with ADLs. Pt does
have a walker. 3 steps to enter their home and no steps inside. Pt has a home
CPAP machine through [employer]. Pt's home O2 is through [facility]. Pt
states she uses 3L continuously.  No hx of HH services or post-acute
placement. Pt's PCP is Dr. Lida Torres. Pt's plan is to discharge home
when medically stable.  SW is following to assist as needed with discharge
planning.

## 2021-07-29 NOTE — NUR
ASSUMED PATIENT CARE AT 0700. A/0 X4. NO CHEST PAIN. NOTED BG > 500. LAB
GLUCOSE STAT ORDERED. IT . GOT ORDER FROM DR BERRY. KEEP MONITOR.

## 2021-07-29 NOTE — NUR
ADMISSION COMPLETED. PT SPECIFICALLY ASK FOR IV PAIN MEDICATION, AT FIRST
MEETING TONIGHT (WHEN SHE ARRIVED ON THE FLOOR). SPOKE WITH APRN, RECCOMMED TO
GIVE ACETAMINOPHEN FOR PAIN CONTROL. SHE COMPLAINS OF ALLOVER BODY ACHES AND
RT ARM SORENESS AFTER COVID SHOT 7/28. IV FLUIDS INFUSING, ANTIBIOTICS
STARTED. ORIENTED TO ROOM AND SURROUNDINGS. CAREPLAN INIATED

## 2021-07-29 NOTE — EKG
[address]  [address]
Phone:  [phone]                    ELECTROCARDIOGRAM REPORT      
_______________________________________________________________________________
 
Name:       EDSON BELL                Room #:         363-P       ADM IN  
M.R.#:      [id number]     Account #:      [id number]  
Admission:  21    Attend Phys:    Nicho Rodarte MD 
Discharge:              Date of Birth:  [birth year]  
                                                          Report #: [id number]
   [id number]
_______________________________________________________________________________
                         [facility] ED
                                       
Test Date:    2021               Test Time:    14:56:23
Pat Name:     EDSON BELL             Department:   
Patient ID:   [id number]            Room:         363
Gender:       F                        Technician:   HALLEY
:          1975               Requested By: Modesto Mcmahon
Order Number: [id number] MD:   Mateo Briceno
                                 Measurements
Intervals                              Axis          
Rate:         111                      P:            42
SD:           109                      QRS:          22
QRSD:         124                      T:            7
QT:           339                                    
QTc:          461                                    
                           Interpretive Statements
Sinus tachycardia
Probable left atrial enlargement
Compared to ECG 06/10/2021 17:34:16
Myocardial infarct finding no longer present
Electronically Signed On 2021 7:15:42 CDT by Mateo Briceno
[email or web address]
 
 
 
 
 
 
 
 
 
 
 
 
 
 
 
 
 
 
 
 
 
  <ELECTRONICALLY SIGNED>
   By: Mateo Briceno MD, [facility]    
  21     0715
D: 21 1456                           _____________________________________
T: 21 1456                           Mateo Briceno MD, [facility]ACC      /EPI

## 2021-07-30 VITALS — DIASTOLIC BLOOD PRESSURE: 66 MMHG | SYSTOLIC BLOOD PRESSURE: 119 MMHG

## 2021-07-30 VITALS — SYSTOLIC BLOOD PRESSURE: 136 MMHG | DIASTOLIC BLOOD PRESSURE: 69 MMHG

## 2021-07-30 VITALS — SYSTOLIC BLOOD PRESSURE: 126 MMHG | DIASTOLIC BLOOD PRESSURE: 62 MMHG

## 2021-07-30 VITALS — DIASTOLIC BLOOD PRESSURE: 65 MMHG | SYSTOLIC BLOOD PRESSURE: 120 MMHG

## 2021-07-30 LAB
ANION GAP SERPL CALC-SCNC: 13 MMOL/L (ref 7–16)
BUN SERPL-MCNC: 31 MG/DL (ref 7–18)
CALCIUM SERPL-MCNC: 8.1 MG/DL (ref 8.5–10.1)
CHLORIDE SERPL-SCNC: 103 MMOL/L (ref 98–107)
CO2 SERPL-SCNC: 23 MMOL/L (ref 21–32)
CREAT SERPL-MCNC: 1.6 MG/DL (ref 0.6–1)
ERYTHROCYTE [DISTWIDTH] IN BLOOD BY AUTOMATED COUNT: 16.4 % (ref 10.5–14.5)
GLUCOSE SERPL-MCNC: 411 MG/DL (ref 74–106)
HCT VFR BLD CALC: 38.9 % (ref 37–47)
HGB BLD-MCNC: 12.8 GM/DL (ref 12–15)
MAGNESIUM SERPL-MCNC: 2.2 MG/DL (ref 1.8–2.4)
MCH RBC QN AUTO: 27.7 PG (ref 26–34)
MCHC RBC AUTO-ENTMCNC: 32.8 G/DL (ref 28–37)
MCV RBC: 84.2 FL (ref 80–100)
PLATELET # BLD: 327 THOU/UL (ref 150–400)
POTASSIUM SERPL-SCNC: 5 MMOL/L (ref 3.5–5.1)
RBC # BLD AUTO: 4.62 MIL/UL (ref 4.2–5)
SODIUM SERPL-SCNC: 139 MMOL/L (ref 136–145)
WBC # BLD AUTO: 12.3 THOU/UL (ref 4–11)

## 2021-07-30 NOTE — NUR
DC'D IV FLUIDS PER NOTE LEFT BY MP.  PT UP TO BATHROOM WITH SBA.  NO FURTHER
COMPLAINTS OF CHEST PAIN OVERNIGHT.  PT CALLS FREQUENTLY FOR SANDWICH TRAYS
SODA, AND CHICKEN BROTH.  BLOOD SUGAR TRENDING DOWN.  VSS OVERNIGHT.  CALL
LIGHT WITHIN REACH.

## 2021-07-30 NOTE — NUR
SW reviewed chart and spoke with nursing and attending physician. Pt is on IV
abx and IV steroids. Pt to have second part of stress test today. Pt is
progressing towards goals for discharge. Chest xray was worse today. COVID
test ordered and is negative. SW spoke with pt via phone to discuss discharge
plan. Per pt, she may discharge home later today or over the weekend. Pt
denies having any SW needs at time of discharge. Pt states she is having
trouble with her portable concentrator at home. Pt uses [facility]
for home O2. SW encouraged pt to contact [facility] to come evaluate her DME at home.
Pt verbalized understanding. Contact info for [facility] placed in pt's discharge
summary. Pt states she will have transportation home when discharged. No SW
needs identified at this time, but is available to assist should needs arise.

## 2021-07-31 VITALS — DIASTOLIC BLOOD PRESSURE: 81 MMHG | SYSTOLIC BLOOD PRESSURE: 148 MMHG

## 2021-07-31 VITALS — DIASTOLIC BLOOD PRESSURE: 61 MMHG | SYSTOLIC BLOOD PRESSURE: 118 MMHG

## 2021-07-31 VITALS — DIASTOLIC BLOOD PRESSURE: 65 MMHG | SYSTOLIC BLOOD PRESSURE: 122 MMHG

## 2021-07-31 VITALS — DIASTOLIC BLOOD PRESSURE: 2 MMHG | SYSTOLIC BLOOD PRESSURE: 12 MMHG

## 2021-07-31 LAB
ALBUMIN SERPL-MCNC: 2.4 G/DL (ref 3.4–5)
ALT SERPL-CCNC: 13 U/L (ref 14–59)
ANION GAP SERPL CALC-SCNC: 6 MMOL/L (ref 7–16)
AST SERPL-CCNC: 7 U/L (ref 15–37)
BASOPHILS NFR BLD AUTO: 0 % (ref 0–2)
BILIRUB SERPL-MCNC: 0.1 MG/DL (ref 0.2–1)
BUN SERPL-MCNC: 37 MG/DL (ref 7–18)
CALCIUM SERPL-MCNC: 8.7 MG/DL (ref 8.5–10.1)
CHLORIDE SERPL-SCNC: 102 MMOL/L (ref 98–107)
CO2 SERPL-SCNC: 26 MMOL/L (ref 21–32)
CREAT SERPL-MCNC: 1.4 MG/DL (ref 0.6–1)
EOSINOPHIL NFR BLD: 0 % (ref 0–3)
ERYTHROCYTE [DISTWIDTH] IN BLOOD BY AUTOMATED COUNT: 16.5 % (ref 10.5–14.5)
GLUCOSE SERPL-MCNC: 442 MG/DL (ref 74–106)
GRANULOCYTES NFR BLD MANUAL: 89.4 % (ref 36–66)
HCT VFR BLD CALC: 38.4 % (ref 37–47)
HGB BLD-MCNC: 12.4 GM/DL (ref 12–15)
LYMPHOCYTES NFR BLD AUTO: 7.5 % (ref 24–44)
MAGNESIUM SERPL-MCNC: 2.3 MG/DL (ref 1.8–2.4)
MCH RBC QN AUTO: 27.5 PG (ref 26–34)
MCHC RBC AUTO-ENTMCNC: 32.3 G/DL (ref 28–37)
MCV RBC: 85 FL (ref 80–100)
MONOCYTES NFR BLD: 3.1 % (ref 1–8)
NEUTROPHILS # BLD: 11.1 THOU/UL (ref 1.4–8.2)
PLATELET # BLD: 307 THOU/UL (ref 150–400)
POTASSIUM SERPL-SCNC: 4.9 MMOL/L (ref 3.5–5.1)
PROT SERPL-MCNC: 6.1 G/DL (ref 6.4–8.2)
RBC # BLD AUTO: 4.52 MIL/UL (ref 4.2–5)
SODIUM SERPL-SCNC: 134 MMOL/L (ref 136–145)
WBC # BLD AUTO: 12.4 THOU/UL (ref 4–11)

## 2021-08-01 VITALS — DIASTOLIC BLOOD PRESSURE: 86 MMHG | SYSTOLIC BLOOD PRESSURE: 144 MMHG

## 2021-08-01 VITALS — SYSTOLIC BLOOD PRESSURE: 136 MMHG | DIASTOLIC BLOOD PRESSURE: 69 MMHG

## 2021-08-01 VITALS — DIASTOLIC BLOOD PRESSURE: 85 MMHG | SYSTOLIC BLOOD PRESSURE: 145 MMHG

## 2021-08-01 NOTE — NUR
PT HAD COMPLAINTS OF NON CARDIAC CHEST PAIN ON LEFT SIDE.  RESTARTED PAIN
MEDICATION PT HAS HAD IN PAST WITH GOOD SUCCESS.  PT REQUEST MULTIPLE FOOD
ITEMS.  FOLLOWING POC WITH IVPB ANTIBIOTIC.  HOURLY ROUNDING.

## 2021-08-04 ENCOUNTER — HOSPITAL ENCOUNTER (EMERGENCY)
Dept: HOSPITAL 35 - ER | Age: 46
Discharge: STILL ADMITTED OR ADMINISTRATIVE | End: 2021-08-04
Payer: COMMERCIAL

## 2021-08-04 VITALS — BODY MASS INDEX: 51.91 KG/M2 | WEIGHT: 293 LBS | HEIGHT: 63 IN

## 2021-08-04 VITALS — SYSTOLIC BLOOD PRESSURE: 127 MMHG | DIASTOLIC BLOOD PRESSURE: 83 MMHG

## 2021-08-04 DIAGNOSIS — J44.9: ICD-10-CM

## 2021-08-04 DIAGNOSIS — F41.9: ICD-10-CM

## 2021-08-04 DIAGNOSIS — R06.02: Primary | ICD-10-CM

## 2021-08-04 DIAGNOSIS — Z79.2: ICD-10-CM

## 2021-08-04 DIAGNOSIS — F32.9: ICD-10-CM

## 2021-08-04 DIAGNOSIS — E66.01: ICD-10-CM

## 2021-08-04 DIAGNOSIS — Z79.899: ICD-10-CM

## 2021-08-04 DIAGNOSIS — Z79.4: ICD-10-CM

## 2021-08-04 DIAGNOSIS — E78.5: ICD-10-CM

## 2021-08-04 DIAGNOSIS — Z88.6: ICD-10-CM

## 2021-08-04 DIAGNOSIS — Z90.49: ICD-10-CM

## 2021-08-04 DIAGNOSIS — Z88.1: ICD-10-CM

## 2021-08-04 DIAGNOSIS — E28.2: ICD-10-CM

## 2021-08-04 DIAGNOSIS — R07.89: ICD-10-CM

## 2021-08-04 DIAGNOSIS — I50.30: ICD-10-CM

## 2021-08-04 DIAGNOSIS — Z79.82: ICD-10-CM

## 2021-08-04 DIAGNOSIS — Z20.822: ICD-10-CM

## 2021-08-04 DIAGNOSIS — I11.0: ICD-10-CM

## 2021-08-04 DIAGNOSIS — E11.9: ICD-10-CM

## 2021-08-04 DIAGNOSIS — K21.9: ICD-10-CM

## 2021-08-04 DIAGNOSIS — Z88.0: ICD-10-CM

## 2021-08-04 DIAGNOSIS — F17.210: ICD-10-CM

## 2021-08-04 LAB
ALBUMIN SERPL-MCNC: 2.7 G/DL (ref 3.4–5)
ALT SERPL-CCNC: 16 U/L (ref 14–59)
ANION GAP SERPL CALC-SCNC: 9 MMOL/L (ref 7–16)
AST SERPL-CCNC: 12 U/L (ref 15–37)
BASOPHILS NFR BLD AUTO: 0.6 % (ref 0–2)
BILIRUB SERPL-MCNC: 0.2 MG/DL (ref 0.2–1)
BUN SERPL-MCNC: 23 MG/DL (ref 7–18)
CALCIUM SERPL-MCNC: 9.6 MG/DL (ref 8.5–10.1)
CHLORIDE SERPL-SCNC: 103 MMOL/L (ref 98–107)
CO2 SERPL-SCNC: 27 MMOL/L (ref 21–32)
CREAT SERPL-MCNC: 1.5 MG/DL (ref 0.6–1)
EOSINOPHIL NFR BLD: 0.1 % (ref 0–3)
ERYTHROCYTE [DISTWIDTH] IN BLOOD BY AUTOMATED COUNT: 16.8 % (ref 10.5–14.5)
GLUCOSE SERPL-MCNC: 342 MG/DL (ref 74–106)
GRANULOCYTES NFR BLD MANUAL: 85.4 % (ref 36–66)
HCT VFR BLD CALC: 42.9 % (ref 37–47)
HGB BLD-MCNC: 14.2 GM/DL (ref 12–15)
LYMPHOCYTES NFR BLD AUTO: 11.2 % (ref 24–44)
MCH RBC QN AUTO: 27.7 PG (ref 26–34)
MCHC RBC AUTO-ENTMCNC: 33.2 G/DL (ref 28–37)
MCV RBC: 83.5 FL (ref 80–100)
MONOCYTES NFR BLD: 2.7 % (ref 1–8)
NEUTROPHILS # BLD: 16.6 THOU/UL (ref 1.4–8.2)
PLATELET # BLD: 396 THOU/UL (ref 150–400)
POTASSIUM SERPL-SCNC: 4.5 MMOL/L (ref 3.5–5.1)
PROT SERPL-MCNC: 6.5 G/DL (ref 6.4–8.2)
RBC # BLD AUTO: 5.15 MIL/UL (ref 4.2–5)
SODIUM SERPL-SCNC: 139 MMOL/L (ref 136–145)
TROPONIN I SERPL-MCNC: <0.06 NG/ML (ref ?–0.06)
WBC # BLD AUTO: 19.5 THOU/UL (ref 4–11)

## 2021-08-05 NOTE — EKG
[place] [address] [address] [facility] [address] [facility]
[place]  [address]
Phone:  [phone]                    ELECTROCARDIOGRAM REPORT      
_______________________________________________________________________________
 
Name:       EDSON BELL                [id number]     Account #:      [id number]  
Admission:  21    Attend Phys:                          
Discharge:  21    Date of Birth:  [birth year]  
                                                          Report #: [id number]
   [id number]
_______________________________________________________________________________
                         [facility] ED
                                       
Test Date:    2021               Test Time:    18:28:41
Pat Name:     EDSON BELL             Department:   
Patient ID:   [id number]            Room:          
Gender:       F                        Technician:   ARIANA
:          1975               Requested By: Hitesh Nichols
Order Number: [id number] MD:   Mateo Briceno
                                 Measurements
Intervals                              Axis          
Rate:         103                      P:            38
KS:           121                      QRS:          10
QRSD:         122                      T:            17
QT:           351                                    
QTc:          460                                    
                           Interpretive Statements
Sinus tachycardia
IVCD, consider atypical RBBB
Compared to ECG 2021 14:56:23
No significant changes
Electronically Signed On 2021 7:30:39 CDT by Mateo Briceno
[email or web address]
 
 
 
 
 
 
 
 
 
 
 
 
 
 
 
 
 
 
 
 
 
  <ELECTRONICALLY SIGNED>
   By: Mateo Briceno MD, [facility]    
  21     0730
D: 21 1828                           _____________________________________
T: 21 1828                           Mateo Briceno MD, FACC      /EPI